# Patient Record
Sex: FEMALE | Race: WHITE | Employment: FULL TIME | URBAN - METROPOLITAN AREA
[De-identification: names, ages, dates, MRNs, and addresses within clinical notes are randomized per-mention and may not be internally consistent; named-entity substitution may affect disease eponyms.]

---

## 2019-12-05 ENCOUNTER — TELEPHONE (OUTPATIENT)
Dept: INFECTIOUS DISEASES | Age: 44
End: 2019-12-05

## 2019-12-06 ENCOUNTER — OFFICE VISIT (OUTPATIENT)
Dept: INFECTIOUS DISEASES | Age: 44
End: 2019-12-06
Payer: COMMERCIAL

## 2019-12-06 VITALS
WEIGHT: 229 LBS | DIASTOLIC BLOOD PRESSURE: 97 MMHG | RESPIRATION RATE: 22 BRPM | BODY MASS INDEX: 38.15 KG/M2 | TEMPERATURE: 99 F | HEART RATE: 94 BPM | SYSTOLIC BLOOD PRESSURE: 149 MMHG | HEIGHT: 65 IN

## 2019-12-06 DIAGNOSIS — N61.1 BREAST ABSCESS: ICD-10-CM

## 2019-12-06 DIAGNOSIS — N60.12 CHRONIC MASTITIS OF LEFT BREAST: Primary | ICD-10-CM

## 2019-12-06 DIAGNOSIS — A49.8 ANAEROBIC BACTERIAL INFECTION: ICD-10-CM

## 2019-12-06 PROCEDURE — 99203 OFFICE O/P NEW LOW 30 MIN: CPT | Performed by: INTERNAL MEDICINE

## 2019-12-06 RX ORDER — METHYLPREDNISOLONE 4 MG/1
4 TABLET ORAL DAILY
COMMUNITY
End: 2019-12-20 | Stop reason: ALTCHOICE

## 2019-12-06 RX ORDER — METRONIDAZOLE 500 MG/1
500 TABLET ORAL 3 TIMES DAILY
Qty: 42 TABLET | Refills: 0 | Status: SHIPPED | OUTPATIENT
Start: 2019-12-06 | End: 2019-12-20 | Stop reason: ALTCHOICE

## 2019-12-06 RX ORDER — LINEZOLID 600 MG/1
600 TABLET, FILM COATED ORAL 2 TIMES DAILY
COMMUNITY
End: 2019-12-20 | Stop reason: ALTCHOICE

## 2019-12-06 RX ORDER — SUCRALFATE 1 G/1
1 TABLET ORAL 4 TIMES DAILY
COMMUNITY
End: 2019-12-20 | Stop reason: ALTCHOICE

## 2019-12-06 RX ORDER — LACTOBACILLUS ACIDOPH-L.BULGARICUS 1 MILLION CELL CHEWABLE TABLET 1MM CELL
1 TABLET,CHEWABLE ORAL 2 TIMES DAILY
Qty: 14 TABLET | Refills: 2 | Status: SHIPPED | OUTPATIENT
Start: 2019-12-06 | End: 2019-12-20 | Stop reason: ALTCHOICE

## 2019-12-20 ENCOUNTER — OFFICE VISIT (OUTPATIENT)
Dept: INFECTIOUS DISEASES | Age: 44
End: 2019-12-20
Payer: COMMERCIAL

## 2019-12-20 VITALS
RESPIRATION RATE: 14 BRPM | BODY MASS INDEX: 37.82 KG/M2 | HEIGHT: 65 IN | TEMPERATURE: 98.9 F | WEIGHT: 227 LBS | SYSTOLIC BLOOD PRESSURE: 112 MMHG | DIASTOLIC BLOOD PRESSURE: 86 MMHG | HEART RATE: 82 BPM

## 2019-12-20 DIAGNOSIS — N61.1 BREAST ABSCESS: ICD-10-CM

## 2019-12-20 DIAGNOSIS — A49.8 ANAEROBIC BACTERIAL INFECTION: ICD-10-CM

## 2019-12-20 DIAGNOSIS — N60.12 CHRONIC MASTITIS OF LEFT BREAST: Primary | ICD-10-CM

## 2019-12-20 PROCEDURE — G8484 FLU IMMUNIZE NO ADMIN: HCPCS | Performed by: INTERNAL MEDICINE

## 2019-12-20 PROCEDURE — 4004F PT TOBACCO SCREEN RCVD TLK: CPT | Performed by: INTERNAL MEDICINE

## 2019-12-20 PROCEDURE — G8417 CALC BMI ABV UP PARAM F/U: HCPCS | Performed by: INTERNAL MEDICINE

## 2019-12-20 PROCEDURE — 99213 OFFICE O/P EST LOW 20 MIN: CPT | Performed by: INTERNAL MEDICINE

## 2019-12-20 PROCEDURE — G8427 DOCREV CUR MEDS BY ELIG CLIN: HCPCS | Performed by: INTERNAL MEDICINE

## 2019-12-20 RX ORDER — METRONIDAZOLE 500 MG/1
500 TABLET ORAL 3 TIMES DAILY
Qty: 42 TABLET | Refills: 0 | Status: SHIPPED | OUTPATIENT
Start: 2019-12-20 | End: 2020-01-03

## 2021-06-07 ENCOUNTER — OFFICE VISIT (OUTPATIENT)
Dept: PAIN MANAGEMENT | Age: 46
End: 2021-06-07
Payer: COMMERCIAL

## 2021-06-07 VITALS
WEIGHT: 220 LBS | BODY MASS INDEX: 38.98 KG/M2 | HEIGHT: 63 IN | DIASTOLIC BLOOD PRESSURE: 88 MMHG | TEMPERATURE: 97.6 F | SYSTOLIC BLOOD PRESSURE: 128 MMHG

## 2021-06-07 DIAGNOSIS — M47.812 CERVICAL SPONDYLOSIS WITHOUT MYELOPATHY: Primary | ICD-10-CM

## 2021-06-07 DIAGNOSIS — Z95.828 S/P PICC CENTRAL LINE PLACEMENT: ICD-10-CM

## 2021-06-07 DIAGNOSIS — M79.602 BILATERAL ARM PAIN: ICD-10-CM

## 2021-06-07 DIAGNOSIS — M77.12 LATERAL EPICONDYLITIS OF BOTH ELBOWS: ICD-10-CM

## 2021-06-07 DIAGNOSIS — M79.601 BILATERAL ARM PAIN: ICD-10-CM

## 2021-06-07 DIAGNOSIS — M77.11 LATERAL EPICONDYLITIS OF BOTH ELBOWS: ICD-10-CM

## 2021-06-07 DIAGNOSIS — N61.0 BREAST INFECTION IN FEMALE: ICD-10-CM

## 2021-06-07 PROCEDURE — G8427 DOCREV CUR MEDS BY ELIG CLIN: HCPCS | Performed by: PHYSICAL MEDICINE & REHABILITATION

## 2021-06-07 PROCEDURE — G8417 CALC BMI ABV UP PARAM F/U: HCPCS | Performed by: PHYSICAL MEDICINE & REHABILITATION

## 2021-06-07 PROCEDURE — 4004F PT TOBACCO SCREEN RCVD TLK: CPT | Performed by: PHYSICAL MEDICINE & REHABILITATION

## 2021-06-07 PROCEDURE — 99213 OFFICE O/P EST LOW 20 MIN: CPT | Performed by: PHYSICAL MEDICINE & REHABILITATION

## 2021-06-07 RX ORDER — TIZANIDINE 4 MG/1
4 TABLET ORAL 4 TIMES DAILY PRN
Qty: 120 TABLET | Refills: 0 | Status: SHIPPED | OUTPATIENT
Start: 2021-06-07 | End: 2021-07-07 | Stop reason: SDUPTHER

## 2021-06-07 RX ORDER — GABAPENTIN 800 MG/1
800 TABLET ORAL 4 TIMES DAILY
Qty: 360 TABLET | Refills: 0 | Status: SHIPPED | OUTPATIENT
Start: 2021-06-07 | End: 2021-09-01 | Stop reason: SDUPTHER

## 2021-06-07 NOTE — PROGRESS NOTES
Ernesto Simpson  (1975)    2021    Subjective:     Ernesto Simpson is 50 y.o. female who complains today of:    Chief Complaint   Patient presents with    Other     anastacio arm pain      Last called 21, last called by me 20: MRI cervical spine denied. EMG done, reviewed below. Her primary care physician is following her elevated LFTs. PICC placed for infection of her right breast.  To getting daily antibiotic infusions. Chest wall pain is resolved. No other test therapy updates modifications, no emergency room visits. Gabapentin 800 mg 4 times daily and tizanidine 4 mg 4 times daily help with remaining functional with chores, personal hygiene, remaining compliant with home exercise program, maintaining her quality of life, and performing activities of daily living. She obtains greater than 50% pain relief without any significant side effects. She is clear to avoid driving or operating heavy machinery or to perform any activities where she may harm herself or others while on pain medications. Low back pain not currently bothering her. Right chest wall pain currently resolved    Neck pain and bilateral arm pain is a 6 out of 10. Gets up to an 8 out of 10. Her pain is worse with activity moving her neck. Her pain is better with rest and pain medicine. It has been a constant ache for over 6 months. Right arm hurts more than left arm due to access venipuncture puncture site. There are no other associated symptoms or contextual factors. She denies any new weakness, saddle anesthesia, bowel or bladder dysfunction, or falls. Elbow pain bilaterally is a 3/10. Pain is worse with work. Allergies:  Bactrim [sulfamethoxazole-trimethoprim], Naproxen, Bupivacaine hcl, Lidocaine, and Penicillins    History reviewed. No pertinent past medical history. Past Surgical History:   Procedure Laterality Date     SECTION          HAND SURGERY      right hand     History reviewed. No pertinent family history. Social History     Socioeconomic History    Marital status: Single     Spouse name: Not on file    Number of children: Not on file    Years of education: Not on file    Highest education level: Not on file   Occupational History    Not on file   Tobacco Use    Smoking status: Current Every Day Smoker     Packs/day: 1.00     Types: Cigarettes    Smokeless tobacco: Former User    Tobacco comment: 1 pack a day   Substance and Sexual Activity    Alcohol use: No    Drug use: Not on file    Sexual activity: Not on file   Other Topics Concern    Not on file   Social History Narrative    Not on file     Social Determinants of Health     Financial Resource Strain:     Difficulty of Paying Living Expenses:    Food Insecurity:     Worried About [address] [employer] in the Last Year:     [address] [employer] in the Last Year:    Transportation Needs:     Lack of Transportation (Medical):  Lack of Transportation (Non-Medical):    Physical Activity:     Days of Exercise per Week:     Minutes of Exercise per Session:    Stress:     Feeling of Stress :    Social Connections:     Frequency of Communication with Friends and Family:     Frequency of Social Gatherings with Friends and Family:     Attends [employer] Services:     Active Member of Clubs or Organizations:     Attends Club or Organization Meetings:     Marital Status:    Intimate Partner Violence:     Fear of Current or Ex-Partner:     Emotionally Abused:     Physically Abused:     Sexually Abused:        Current Outpatient Medications on File Prior to Visit   Medication Sig Dispense Refill    CHANTIX STARTING MONTH MARIAA 0.5 MG X 11 & 1 MG X 42 tablet       lidocaine (LMX) 4 % cream Apply a half dollar sized amount to intact skin topically up to twice daily as needed for pain 1 Tube 1     No current facility-administered medications on file prior to visit. She has tried physical therapy.    Date of lastof last PT treatment: February 2020 @ [facility] for her arms. HPI    Review of Systems   Constitutional: Negative for fever. HENT: Negative for hearing loss. Respiratory: Negative for shortness of breath. Gastrointestinal: Negative for constipation, diarrhea and nausea. Genitourinary: Negative for difficulty urinating. Musculoskeletal: Negative for back pain and neck pain. Skin: Negative for rash. Neurological: Negative for headaches. Hematological: Does not bruise/bleed easily. Psychiatric/Behavioral: Positive for sleep disturbance. Objective:     Vitals:  /88   Temp 97.6 °F (36.4 °C)   Ht 5' 3\" (1.6 m)   Wt 220 lb (99.8 kg)   BMI 38.97 kg/m² Pain Score:   6      Physical Exam  General: No acute distress  Eyes: No scleral icterus appreciated bilaterally  ENMT: Moist mucous membranes  Neck: Symmetric without any overt masses or lesions  Respiratory: Respirations are non-labored  Skin: Visualized skin is intact  Psych: Pleasant and cooperative with history and exam.  Neurologic: Gait non antalgic, no assistive devices for ambulation. Pt is alert and appropriately interactive. No signs of excessive sedation. Responds promptly and appropriately to questions asked. No aberrant behaviors appreciated. Mild tenderness lat epicondyle bilaterally   Tender more in distal triceps bilaterally  PICC left arm    Sensation intact in both arms except for ? C6 paresthesias bilaterally  Reflexes and strength functional for arm use, no abnormal reflexes appreciated on exam today  Strength greater than 3/5 in both arms  Spurling's positive on exam today      EMG B UE 2/22/2021: This study is normal. There is no current electrodiagnostic evidence for an active bilateral cervical motor radiculopathy, bilateral median mononeuropathy, or generalized large fiber sensorimotor peripheral polyneuropathy. US B Elbows 3/11/20: bilateral common extensor tendon tendinopathy. Normal ulnar nerve measurements.   EMG B UE 1/17/20: This study is limited, but normal. There is no current electrodiagnostic evidence for a generalized large fiber sensorimotor peripheral polyneuropathy. Although limited, there is no clear evidence for an active cervical motor radiculopathy. X-rays cervical spine 10/7/19: Degenerative disc disease C5-6. No fracture. Assessment:      Diagnosis Orders   1. Cervical spondylosis without myelopathy  MRI CERVICAL SPINE W WO CONTRAST    gabapentin (NEURONTIN) 800 MG tablet    tiZANidine (ZANAFLEX) 4 MG tablet    XR CERVICAL SPINE (2-3 VIEWS)   2. Bilateral arm pain  MRI CERVICAL SPINE W WO CONTRAST    XR CERVICAL SPINE (2-3 VIEWS)   3. S/P PICC central line placement  MRI CERVICAL SPINE W WO CONTRAST   4. Breast infection in female  MRI CERVICAL SPINE W WO CONTRAST   5. Lateral epicondylitis of both elbows  tiZANidine (ZANAFLEX) 4 MG tablet       -Min relief with Meloxicam 7.5 mg BID, allergy with Naproxen. Plan:     Periodic Controlled Substance Monitoring: Obtaining appropriate analgesic effect of treatment. Farhad Ye MD)    Orders Placed This Encounter   Medications    gabapentin (NEURONTIN) 800 MG tablet     Sig: Take 1 tablet by mouth 4 times daily for 90 days.      Dispense:  360 tablet     Refill:  0    tiZANidine (ZANAFLEX) 4 MG tablet     Sig: Take 1 tablet by mouth 4 times daily as needed (pain spasms) As needed for pain and spasms     Dispense:  120 tablet     Refill:  0       Orders Placed This Encounter   Procedures    MRI CERVICAL SPINE W WO CONTRAST     Standing Status:   Future     Standing Expiration Date:   9/5/2021     Order Specific Question:   Reason for exam:     Answer:   eval canal stenosis, h/o breast infection on PICC    XR CERVICAL SPINE (2-3 VIEWS)     Standing Status:   Future     Standing Expiration Date:   6/7/2022     Order Specific Question:   Reason for exam:     Answer:   Please evaluate for the presence of cervical facet arthropathy     -Follow with primary care regarding elevated ALT liver enzyme test.  Follow-up with infectious disease as recommended. XR C-spine AP Lat eval facet arthropathy  Due to persistent pain despite conservative treatment, inability to tolerate NSAIDs due to allergy with anaphylaxis, recommend:  -MRI C Spine with and without contrast eval canal stenosis, ongoing infection requiring IV antibiotics  -Continue with PT and home exercise program   -Given Naproxen allergy with anaphylaxis, will hold on other NSAIDs trials. Min relife with Meloxicam 7.5 mg BID due to min relief  -Continue gabapentin 800 mg QID #360 no refills valid June to September 2021. OAR[place] reviewed on 6/7/2021   -Continue Tizanidine 4 mg 4 times daily as needed #120 no refills start 6/7/2021 Avoid all other muscle relaxers and/or sedating medicines. -Will hold on interventions given lack of clear pain source. Encourage Neurology evaluation      Controlled Substance Monitoring:    Acute and Chronic Pain Monitoring:   RX Monitoring 6/7/2021   Periodic Controlled Substance Monitoring Obtaining appropriate analgesic effect of treatment. Discussed the risks, side effects, and symptoms that would warrant urgent or emergent physician evaluation of all medications prescribed today. The patient was advised that NSAID-type medications have three important potential side effects: gastrointestinal irritation including hemorrhage, myocardial injury including possible infarction, and kidney injury. She was asked to take the medication with food, avoid any other NSAIDs or steroids, and discontinue if she develops any concerning symptoms. She should immediately stop the medication and proceed to the emergency department for chest pain, dark urine or difficulty with producing urine, vomiting, abdominal pain or black/tarry stools. The patient expresses understanding of these issues and all questions were answered.      Provided education and counseling regarding the diagnosis, prognosis, and treatment options. All questions were answered. Encouraged her to follow-up with her primary care physician and/or specialists as required for her overall health and management of her comorbidities as well as any new positive symptoms mentioned in review of systems above. Care was provided within the definitions and limitations of our specialty practice. Encouraged lifestyle interventions including healthy habits, lifestyle changes, regular aerobic exercise and appropriate weight maintenance as advised by their primary care physician or cardiovascular health provider. Discussed well care and disease prevention/maintenance. All recommendations for medications are meant to help decrease pain, improve function with activities of daily living, maintain compliance with home exercise program, and improve quality of life. Encouraged compliance with her home exercise program. Discussed the elevated risks of excessive sedation while on pain medications. Advised her against driving or operating heavy machinery or performing any activities where she may harm herself or others while on pain medications. Particular caution was emphasized especially during dose adjustments and medication changes. Discussed the risks of temporary disability, permanent disability, morbidity, and mortality with poorly-managed or undiagnosed medical conditions and comorbidities. Emphasized the importance of timely medical evaluation and treatment as previously recommended by us or other medical professionals. Risks of not pursing these recommendations were emphasized. Advised her that any lab testing, imaging, or other diagnostic test results are best discussed in person in the office so that we can provide a clear explanation of their significance and best treatment based upon these results. It is her responsibility to make and keep a follow up appointment to discuss these test results in person.  She expressed complete understanding and agreement with the entire plan as outlined above. Portions of this note may have been typed, auto-populated, dictated or transcribed by voice recognition resulting in errors, omissions, or close substitutions which may be missed despite careful proofreading. Please contact the author for any questions or concerns. This note was not reviewed to expedite clinical care. Follow up:  Return in about 1 month (around 7/7/2021) for reassessment of pain and symptoms.     Gary Thornton MD

## 2021-06-17 DIAGNOSIS — M79.602 BILATERAL ARM PAIN: ICD-10-CM

## 2021-06-17 DIAGNOSIS — M79.601 BILATERAL ARM PAIN: ICD-10-CM

## 2021-06-17 DIAGNOSIS — M47.812 CERVICAL SPONDYLOSIS WITHOUT MYELOPATHY: ICD-10-CM

## 2021-06-23 ENCOUNTER — TELEPHONE (OUTPATIENT)
Dept: PAIN MANAGEMENT | Age: 46
End: 2021-06-23

## 2021-06-23 NOTE — TELEPHONE ENCOUNTER
Received call from [facility] [place] [employer]. They state that patient's insurance is requiring a peer to peer, number to call is [phone] ref #[id number].

## 2021-06-25 NOTE — TELEPHONE ENCOUNTER
Okay to call for peer to peer, please interrupt me when they are on the phone so that this may be completed.  Thank you, SM

## 2021-06-30 NOTE — TELEPHONE ENCOUNTER
Stefan from [address] [place] [address] called to see if peer to peer had been completed. She said right now the MRI is scheduled for 7/7 but they will need to cancel it if the auth is not completed.

## 2021-07-01 NOTE — TELEPHONE ENCOUNTER
Peer to peer has not yet been completed. Okay to proceed with peer to peer as soon as representative is available.

## 2021-07-07 ENCOUNTER — OFFICE VISIT (OUTPATIENT)
Dept: PAIN MANAGEMENT | Age: 46
End: 2021-07-07
Payer: COMMERCIAL

## 2021-07-07 VITALS
WEIGHT: 200 LBS | DIASTOLIC BLOOD PRESSURE: 88 MMHG | BODY MASS INDEX: 33.32 KG/M2 | TEMPERATURE: 97.1 F | HEIGHT: 65 IN | SYSTOLIC BLOOD PRESSURE: 128 MMHG

## 2021-07-07 DIAGNOSIS — M77.11 LATERAL EPICONDYLITIS OF BOTH ELBOWS: ICD-10-CM

## 2021-07-07 DIAGNOSIS — M47.812 CERVICAL SPONDYLOSIS WITHOUT MYELOPATHY: ICD-10-CM

## 2021-07-07 DIAGNOSIS — M77.12 LATERAL EPICONDYLITIS OF BOTH ELBOWS: ICD-10-CM

## 2021-07-07 PROCEDURE — G8427 DOCREV CUR MEDS BY ELIG CLIN: HCPCS | Performed by: NURSE PRACTITIONER

## 2021-07-07 PROCEDURE — G8417 CALC BMI ABV UP PARAM F/U: HCPCS | Performed by: NURSE PRACTITIONER

## 2021-07-07 PROCEDURE — 4004F PT TOBACCO SCREEN RCVD TLK: CPT | Performed by: NURSE PRACTITIONER

## 2021-07-07 PROCEDURE — 99213 OFFICE O/P EST LOW 20 MIN: CPT | Performed by: NURSE PRACTITIONER

## 2021-07-07 RX ORDER — TIZANIDINE 4 MG/1
4 TABLET ORAL 4 TIMES DAILY PRN
Qty: 120 TABLET | Refills: 0 | Status: SHIPPED | OUTPATIENT
Start: 2021-07-07 | End: 2021-08-02 | Stop reason: SDUPTHER

## 2021-07-07 ASSESSMENT — ENCOUNTER SYMPTOMS
CONSTIPATION: 0
BACK PAIN: 0
TROUBLE SWALLOWING: 0
COUGH: 0
SHORTNESS OF BREATH: 0
GASTROINTESTINAL NEGATIVE: 1
EYES NEGATIVE: 1
DIARRHEA: 0
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
## 2021-07-12 NOTE — TELEPHONE ENCOUNTER
SPOKE WITH THE PATIENT AND SHE STATED SHE RECEIVED A LETTER STATING IT WAS DENIED AND THEN SHE GOT A CALL LATER STATING IT WAS APPROVED, THEY WANTED HER TO COME TO [facility] IN [place], PATIENT STATED SHE WILL CALL THEM TO SEE WHAT IS GOING, AND WANTED TO KNOW IF ITS NOT APPROVED THEN WHAT DOES SHE NEED TO DO, I TOLD HER SHE WOULD HAVE TO SPEAK TO THEM REGARDING ANY PAYMENTS.

## 2021-07-28 ENCOUNTER — HOSPITAL ENCOUNTER (OUTPATIENT)
Dept: MRI IMAGING | Age: 46
Discharge: HOME | End: 2021-07-30
Payer: COMMERCIAL

## 2021-07-28 DIAGNOSIS — M47.812 CERVICAL SPONDYLOSIS WITHOUT MYELOPATHY: ICD-10-CM

## 2021-07-28 DIAGNOSIS — M79.602 BILATERAL ARM PAIN: ICD-10-CM

## 2021-07-28 DIAGNOSIS — Z95.828 S/P PICC CENTRAL LINE PLACEMENT: ICD-10-CM

## 2021-07-28 DIAGNOSIS — N61.0 BREAST INFECTION IN FEMALE: ICD-10-CM

## 2021-07-28 DIAGNOSIS — M79.601 BILATERAL ARM PAIN: ICD-10-CM

## 2021-07-28 PROCEDURE — 72141 MRI NECK SPINE W/O DYE: CPT

## 2021-08-02 ENCOUNTER — VIRTUAL VISIT (OUTPATIENT)
Dept: PAIN MANAGEMENT | Age: 46
End: 2021-08-02
Payer: COMMERCIAL

## 2021-08-02 DIAGNOSIS — M79.602 BILATERAL ARM PAIN: ICD-10-CM

## 2021-08-02 DIAGNOSIS — M43.10 RETROLISTHESIS OF VERTEBRAE: ICD-10-CM

## 2021-08-02 DIAGNOSIS — M47.812 CERVICAL SPONDYLOSIS WITHOUT MYELOPATHY: Primary | ICD-10-CM

## 2021-08-02 DIAGNOSIS — M77.11 LATERAL EPICONDYLITIS OF BOTH ELBOWS: ICD-10-CM

## 2021-08-02 DIAGNOSIS — M79.601 BILATERAL ARM PAIN: ICD-10-CM

## 2021-08-02 DIAGNOSIS — M77.12 LATERAL EPICONDYLITIS OF BOTH ELBOWS: ICD-10-CM

## 2021-08-02 PROCEDURE — G8427 DOCREV CUR MEDS BY ELIG CLIN: HCPCS | Performed by: PHYSICAL MEDICINE & REHABILITATION

## 2021-08-02 PROCEDURE — 4004F PT TOBACCO SCREEN RCVD TLK: CPT | Performed by: PHYSICAL MEDICINE & REHABILITATION

## 2021-08-02 PROCEDURE — 99213 OFFICE O/P EST LOW 20 MIN: CPT | Performed by: PHYSICAL MEDICINE & REHABILITATION

## 2021-08-02 PROCEDURE — G8417 CALC BMI ABV UP PARAM F/U: HCPCS | Performed by: PHYSICAL MEDICINE & REHABILITATION

## 2021-08-02 RX ORDER — TIZANIDINE 4 MG/1
4 TABLET ORAL 4 TIMES DAILY PRN
Qty: 120 TABLET | Refills: 0 | Status: SHIPPED | OUTPATIENT
Start: 2021-08-06 | End: 2021-09-01 | Stop reason: SDUPTHER

## 2021-08-02 ASSESSMENT — ENCOUNTER SYMPTOMS
DIARRHEA: 0
CONSTIPATION: 0
NAUSEA: 0
SHORTNESS OF BREATH: 0
BACK PAIN: 0

## 2021-08-02 NOTE — PROGRESS NOTES
Arlette Read  (1975)    8/2/2021    TELEHEALTH EVALUATION -- Audio/Visual (During [facility]OJJC-59 public health emergency)    Due to COVID 19 outbreak, patient's office visit was converted to a virtual visit. Patient was contacted and agreed to proceed with a virtual visit via audio-visual platform. Patient understands that this encounter is a billable visit and that insurance coverage and co-pays are up to their individual insurance plans. At the beginning of this telehealth encounter, I verified with the patient their name and date of birth. Verbal consent for telehealth encounter was obtained. Called on 8/2/2021 at 6:21 pm and ended at 6:37 pm    Subjective:       Chief Complaint   Patient presents with    Neck Pain       Arlette Read is 50 y.o. female who complains today of:     Last seen 6/7/21 by me, last seen 7/7/21: dog had a stroke, having a difficult time, worried about a brain tumor. MRI cervical spine x-ray cervical spine done, reviewed below. No other updates from other physicians, no emergency room visits. No other test therapy updates modifications, no emergency room visits. Gabapentin 800 mg 4 times daily and tizanidine 4 mg 4 times daily help with remaining functional with chores, personal hygiene, remaining compliant with home exercise program, maintaining her quality of life, and performing activities of daily living. She obtains greater than 50% pain relief without any significant side effects. She is clear to avoid driving or operating heavy machinery or to perform any activities where she may harm herself or others while on pain medications.      Neck pain and bilateral arm pain is a 4/10. Gets to a 7/10. Located in the back of both arms and goes into both hands. Pain is worse with activity. Better with rest and pain medicine. Constant ache for over 8 months. Right arm hurts more than left arm due to access venipuncture puncture site.  There are no other associated symptoms or contextual factors. She denies any new weakness, saddle anesthesia, bowel or bladder dysfunction, or falls.      Elbow pain bilaterally is a 2/10. Pain is worse with work. She denies any clear joint related pain in her shoulders elbow or hands bilaterally. Allergies:  Bactrim [sulfamethoxazole-trimethoprim], Naproxen, Bupivacaine hcl, Lidocaine, and Penicillins    History:  History reviewed. No pertinent past medical history. Past Surgical History:   Procedure Laterality Date     SECTION          HAND SURGERY      right hand     History reviewed. No pertinent family history. Social History     Socioeconomic History    Marital status: Single     Spouse name: Not on file    Number of children: Not on file    Years of education: Not on file    Highest education level: Not on file   Occupational History    Not on file   Tobacco Use    Smoking status: Current Every Day Smoker     Packs/day: 1.00     Types: Cigarettes    Smokeless tobacco: Former User    Tobacco comment: 1 pack a day   Substance and Sexual Activity    Alcohol use: No    Drug use: Not on file    Sexual activity: Not on file   Other Topics Concern    Not on file   Social History Narrative    Not on file     Social Determinants of Health     Financial Resource Strain:     Difficulty of Paying Living Expenses:    Food Insecurity:     Worried About [address] [facility] in the Last Year:     [address] in the Last Year:    Transportation Needs:     Lack of Transportation (Medical):      Lack of Transportation (Non-Medical):    Physical Activity:     Days of Exercise per Week:     Minutes of Exercise per Session:    Stress:     Feeling of Stress :    Social Connections:     Frequency of Communication with Friends and Family:     Frequency of Social Gatherings with Friends and Family:     Attends Yarsanism Services:     Active Member of Clubs or Organizations:     Attends Club or Organization Meetings:     Marital Status:    Intimate Partner Violence:     Fear of Current or Ex-Partner:     Emotionally Abused:     Physically Abused:     Sexually Abused:        Current Outpatient Medications on File Prior to Visit   Medication Sig Dispense Refill    gabapentin (NEURONTIN) 800 MG tablet Take 1 tablet by mouth 4 times daily for 90 days. 360 tablet 0    CHANTIX STARTING MONTH MARIAA 0.5 MG X 11 & 1 MG X 42 tablet       lidocaine (LMX) 4 % cream Apply a half dollar sized amount to intact skin topically up to twice daily as needed for pain 1 Tube 1     No current facility-administered medications on file prior to visit. Review of Systems   Constitutional: Negative for fever. HENT: Negative for hearing loss. Respiratory: Negative for shortness of breath. Gastrointestinal: Negative for constipation, diarrhea and nausea. Genitourinary: Negative for difficulty urinating. Musculoskeletal: Negative for back pain and neck pain. Skin: Negative for rash. Neurological: Negative for headaches. Hematological: Does not bruise/bleed easily. Psychiatric/Behavioral: Negative for sleep disturbance. Objective:     Vitals: There were no vitals taken for this visit. PHYSICAL EXAMINATION:    [x] Alert  [x] Oriented to person/place/time  [x] No apparent distress      [x] Mood and affect normal  [x] Recent and remote memory intact  [x] Judgement and insight normal     [x] Breathing appears normal  [x] No rash, lesions or ulcers on visible skin    [] Cranial Nerves II-XII grossly intact    [] Motor grossly intact in visible upper extremities    [] Motor grossly intact in visible lower extremities    [] Normal gait and station   [] No digital cyanosis    [] OTHER:      Due to this being a TeleHealth encounter, evaluation of the following organ systems is limited: Vitals/Constitutional/EENT/Resp/CV/GI//MS/Neuro/Skin/Heme-Lymph-Imm.       MRI cervical spine 7/28/2021: Degenerative disease and facet arthropathy. No fracture. C1-2 normal.  C2-3 normal canal foramen. C3-4 normal canal foramen. C4-5 normal canal foramen. C5-6 mild canal stenosis, mild bilateral foraminal narrowing. C6-7 borderline canal stenosis, normal foramen. C7-T1 normal canal foramen. XR C-spine 6/17/2021: Degenerative disease and facet arthropathy,, no no fracture, C5-6 retrolisthesis. EMG B UE 2/22/2021: This study is normal. There is no current electrodiagnostic evidence for an active bilateral cervical motor radiculopathy, bilateral median mononeuropathy, or generalized large fiber sensorimotor peripheral polyneuropathy. US B Elbows 3/11/20: bilateral common extensor tendon tendinopathy. Normal ulnar nerve measurements. EMG B UE 1/17/20: This study is limited, but normal. There is no current electrodiagnostic evidence for a generalized large fiber sensorimotor peripheral polyneuropathy.  Although limited, there is no clear evidence for an active cervical motor radiculopathy. X-rays cervical spine 10/7/19: Degenerative disc disease C5-6.  No fracture. Assessment:      Diagnosis Orders   1. Cervical spondylosis without myelopathy  tiZANidine (ZANAFLEX) 4 MG tablet    XR CERVICAL SPINE W OBLIQUES FLEXION AND EXTENSION   2. Lateral epicondylitis of both elbows  tiZANidine (ZANAFLEX) 4 MG tablet   3. Retrolisthesis of vertebrae  XR CERVICAL SPINE W OBLIQUES FLEXION AND EXTENSION   4. Bilateral arm pain  External Referral to Pain Management     -Min relief with Meloxicam 7.5 mg BID, allergy with Naproxen. Plan:     Periodic Controlled Substance Monitoring: Obtaining appropriate analgesic effect of treatment.  hCristiano Stoddard MD)    Orders Placed This Encounter   Medications    tiZANidine (ZANAFLEX) 4 MG tablet     Sig: Take 1 tablet by mouth 4 times daily as needed (pain spasms) As needed for pain and spasms     Dispense:  120 tablet     Refill:  0       Orders Placed This Encounter   Procedures    XR CERVICAL SPINE W OBLIQUES FLEXION AND EXTENSION     Standing Status:   Future     Standing Expiration Date:   8/2/2022     Order Specific Question:   Reason for exam:     Answer:   eval instability    External Referral to Pain Management     Referral Priority:   Routine     Referral Type:   Eval and Treat     Referral Reason:   Specialty Services Required     Requested Specialty:   Pain Management     Number of Visits Requested:   1     -Follow with primary care regarding elevated ALT liver enzyme test.  Follow-up with infectious disease as recommended. Reviewed XR C-spine and MRI C-spine above, all questions answered  XR C-spine flexion-extension eval instability, retrolisthesis C5-6  Unclear cause for patient's bilateral arm pain. Will refer her to sports medicine as well as second opinion pain management for further evaluation. Continue with PT and home exercise program   -Given Naproxen allergy with anaphylaxis, will hold on other NSAIDs trials. Min relife with Meloxicam 7.5 mg BID due to min relief  -Continue gabapentin 800 mg QID , no ref June to September 2021. [place] reviewed on 8/2/2021   -Continue Tizanidine 4 mg 4 times daily as needed #120 no refills start 8/6-9/5/21. Avoid all other muscle relaxers and/or sedating medicines. -Will hold on interventions given lack of clear pain source. Encourage Neurology, sports medicine, and pain management 2nd opinions. Controlled Substance Monitoring:    Acute and Chronic Pain Monitoring:   RX Monitoring 8/2/2021   Periodic Controlled Substance Monitoring Obtaining appropriate analgesic effect of treatment. Discussed the risks, side effects, and symptoms that would warrant urgent or emergent physician evaluation of all medications prescribed today. The patient was advised that NSAID-type medications have three important potential side effects: gastrointestinal irritation including hemorrhage, myocardial injury including possible infarction, and kidney injury.  She was asked to take the medication with food, avoid any other NSAIDs or steroids, and discontinue if she develops any concerning symptoms. She should immediately stop the medication and proceed to the emergency department for chest pain, dark urine or difficulty with producing urine, vomiting, abdominal pain or black/tarry stools. The patient expresses understanding of these issues and all questions were answered.      Provided education and counseling regarding the diagnosis, prognosis, and treatment options. All questions were answered. Encouraged her to follow-up with her primary care physician and/or specialists as required for her overall health and management of her comorbidities as well as any new positive symptoms mentioned in review of systems above. Care was provided within the definitions and limitations of our specialty practice. Encouraged lifestyle interventions including healthy habits, lifestyle changes, regular aerobic exercise and appropriate weight maintenance as advised by their primary care physician or cardiovascular health provider. Discussed well care and disease prevention/maintenance.      All recommendations for medications are meant to help decrease pain, improve function with activities of daily living, maintain compliance with home exercise program, and improve quality of life.      Encouraged compliance with her home exercise program. Discussed the elevated risks of excessive sedation while on pain medications. Advised her against driving or operating heavy machinery or performing any activities where she may harm herself or others while on pain medications. Particular caution was emphasized especially during dose adjustments and medication changes.      Discussed the risks of temporary disability, permanent disability, morbidity, and mortality with poorly-managed or undiagnosed medical conditions and comorbidities.  Emphasized the importance of timely medical evaluation and treatment as previously recommended by us or other medical professionals. Risks of not pursing these recommendations were emphasized.     Advised her that any lab testing, imaging, or other diagnostic test results are best discussed in person in the office so that we can provide a clear explanation of their significance and best treatment based upon these results. It is her responsibility to make and keep a follow up appointment to discuss these test results in person. She expressed complete understanding and agreement with the entire plan as outlined above. Portions of this note may have been typed, auto-populated, dictated or transcribed by voice recognition resulting in errors, omissions, or close substitutions which may be missed despite careful proofreading. Please contact the author for any questions or concerns. This note was not reviewed to expedite clinical care. Follow up:  Return in about 1 month (around 9/2/2021) for reassessment of pain and symptoms, External Referral.    Ansel Lanes, MD      >50% of 16 minute appointment was spent with discussion, addressing questions and concerns, including prognosis, treatment options, patient education, and recommendations. [id number]    Pursuant to the emergency declaration under the [facility], 1135 waiver authority and the [employer] Act, this Virtual  Visit was conducted, with patient's consent, to reduce the patient's risk of exposure to COVID-19 and provide continuity of care for an established patient. Services were provided through a video synchronous discussion virtually to substitute for in-person clinic visit.

## 2021-09-01 ENCOUNTER — VIRTUAL VISIT (OUTPATIENT)
Dept: PAIN MANAGEMENT | Age: 46
End: 2021-09-01
Payer: COMMERCIAL

## 2021-09-01 DIAGNOSIS — M77.12 LATERAL EPICONDYLITIS OF BOTH ELBOWS: ICD-10-CM

## 2021-09-01 DIAGNOSIS — M25.511 CHRONIC PAIN OF BOTH SHOULDERS: Primary | ICD-10-CM

## 2021-09-01 DIAGNOSIS — M79.601 BILATERAL ARM PAIN: ICD-10-CM

## 2021-09-01 DIAGNOSIS — G89.29 CHRONIC PAIN OF BOTH SHOULDERS: Primary | ICD-10-CM

## 2021-09-01 DIAGNOSIS — M25.512 CHRONIC PAIN OF BOTH SHOULDERS: Primary | ICD-10-CM

## 2021-09-01 DIAGNOSIS — M47.812 CERVICAL SPONDYLOSIS WITHOUT MYELOPATHY: ICD-10-CM

## 2021-09-01 DIAGNOSIS — M77.11 LATERAL EPICONDYLITIS OF BOTH ELBOWS: ICD-10-CM

## 2021-09-01 DIAGNOSIS — M79.602 BILATERAL ARM PAIN: ICD-10-CM

## 2021-09-01 PROCEDURE — 99214 OFFICE O/P EST MOD 30 MIN: CPT | Performed by: PHYSICAL MEDICINE & REHABILITATION

## 2021-09-01 PROCEDURE — 4004F PT TOBACCO SCREEN RCVD TLK: CPT | Performed by: PHYSICAL MEDICINE & REHABILITATION

## 2021-09-01 PROCEDURE — G8427 DOCREV CUR MEDS BY ELIG CLIN: HCPCS | Performed by: PHYSICAL MEDICINE & REHABILITATION

## 2021-09-01 PROCEDURE — G8417 CALC BMI ABV UP PARAM F/U: HCPCS | Performed by: PHYSICAL MEDICINE & REHABILITATION

## 2021-09-01 RX ORDER — GABAPENTIN 800 MG/1
800 TABLET ORAL 4 TIMES DAILY
Qty: 360 TABLET | Refills: 0 | Status: SHIPPED | OUTPATIENT
Start: 2021-09-01 | End: 2021-11-17 | Stop reason: SDUPTHER

## 2021-09-01 RX ORDER — TIZANIDINE 4 MG/1
4 TABLET ORAL 4 TIMES DAILY PRN
Qty: 120 TABLET | Refills: 0 | Status: SHIPPED | OUTPATIENT
Start: 2021-09-01 | End: 2021-10-04 | Stop reason: SDUPTHER

## 2021-09-01 ASSESSMENT — ENCOUNTER SYMPTOMS
DIARRHEA: 0
CONSTIPATION: 0
NAUSEA: 0
SHORTNESS OF BREATH: 0
BACK PAIN: 0

## 2021-09-01 NOTE — PROGRESS NOTES
Rajni Mcleod  (1975)    9/1/2021    TELEHEALTH EVALUATION -- Audio/Visual (During XFDOJ-03 public health emergency)    Due to COVID 19 outbreak, patient's office visit was converted to a virtual visit. Patient was contacted and agreed to proceed with a virtual visit via audio-visual platform. Patient understands that this encounter is a billable visit and that insurance coverage and co-pays are up to their individual insurance plans. At the beginning of this telehealth encounter, I verified with the patient their name and date of birth. Verbal consent for telehealth encounter was obtained. Called on 9/1/2021 at 12:28 pm and ended at 12:40 pm    Subjective:       Chief Complaint   Patient presents with    Arm Pain       Rajni Mcleod is 50 y.o. female who complains today of:     Last called by me on 8/2/2021: Her dog had aspiration pneumonia. Was not expected to live. Was given lots of antibiotics and her dog miraculously improved. She was given a $3000 vet bill. The patient is doing better as her dog is doing better. She did not get the x-ray done, confused that she had gotten an x-ray of her cervical spine done before. We discussed the difference that the flexion-extension films can make. No other updates from other physicians, no emergency room visits. No other test therapy updates modifications, no emergency room visits. Gabapentin 800 mg 4 times daily and tizanidine 4 mg QID help with remaining functional with chores, personal hygiene, remaining compliant with home exercise program, maintaining her quality of life, and performing activities of daily living. She obtains greater than 50% pain relief without any significant side effects. She is clear to avoid driving or operating heavy machinery or to perform any activities where she may harm herself or others while on pain medications. Worst pain today is located both shoulders. Bilateral shoulder pain is a 5/10.   Gets up to 7/10.  Is been a constant ache for over 3 months. The pain is worse with lifting things and using her arms. The pain is better with rest and pain medicine. There are no other associated symptoms or contextual factors. She denies any classic radicular symptoms, new weakness, saddle anesthesia, bowel or bladder dysfunction, or falls. Bilateral arm pain is a 5/10. Gets to a 7/10. Located in the back of both arms and goes into both hands. Pain is worse with activity. Better with rest and pain medicine. It has been a constant ache for over 9 months. Right arm hurts more than left arm. There are no other associated symptoms or contextual factors. She denies any new weakness, saddle anesthesia, bowel or bladder dysfunction, or falls.      Elbow pain bilaterally is a 1/10. Pain is worse with work. She denies any clear joint related pain in her shoulders elbow or hands bilaterally. Allergies:  Bactrim [sulfamethoxazole-trimethoprim], Naproxen, Bupivacaine hcl, Lidocaine, and Penicillins    History:  History reviewed. No pertinent past medical history. Past Surgical History:   Procedure Laterality Date     SECTION      1993    HAND SURGERY      right hand     History reviewed. No pertinent family history.   Social History     Socioeconomic History    Marital status: Single     Spouse name: Not on file    Number of children: Not on file    Years of education: Not on file    Highest education level: Not on file   Occupational History    Not on file   Tobacco Use    Smoking status: Current Every Day Smoker     Packs/day: 1.00     Types: Cigarettes    Smokeless tobacco: Former User    Tobacco comment: 1 pack a day   Substance and Sexual Activity    Alcohol use: No    Drug use: Not on file    Sexual activity: Not on file   Other Topics Concern    Not on file   Social History Narrative    Not on file     Social Determinants of Health     Financial Resource Strain:     Difficulty of Paying Living Expenses:    Food Insecurity:     Worried About Running Out of Food in the Last Year:     [address] in the Last Year:    Transportation Needs:     Lack of Transportation (Medical):  Lack of Transportation (Non-Medical):    Physical Activity:     Days of Exercise per Week:     Minutes of Exercise per Session:    Stress:     Feeling of Stress :    Social Connections:     Frequency of Communication with Friends and Family:     Frequency of Social Gatherings with Friends and Family:     Attends Islam Services:     Active Member of Clubs or Organizations:     Attends Club or Organization Meetings:     Marital Status:    Intimate Partner Violence:     Fear of Current or Ex-Partner:     Emotionally Abused:     Physically Abused:     Sexually Abused:        Current Outpatient Medications on File Prior to Visit   Medication Sig Dispense Refill    CHANTIX STARTING MONTH MARIAA 0.5 MG X 11 & 1 MG X 42 tablet       lidocaine (LMX) 4 % cream Apply a half dollar sized amount to intact skin topically up to twice daily as needed for pain 1 Tube 1     No current facility-administered medications on file prior to visit. Review of Systems   Constitutional: Negative for fever. HENT: Negative for hearing loss. Respiratory: Negative for shortness of breath. Gastrointestinal: Negative for constipation, diarrhea and nausea. Genitourinary: Negative for difficulty urinating. Musculoskeletal: Negative for back pain and neck pain. Skin: Negative for rash. Neurological: Negative for headaches. Hematological: Does not bruise/bleed easily. Psychiatric/Behavioral: Negative for sleep disturbance. Objective:     Vitals: There were no vitals taken for this visit.      PHYSICAL EXAMINATION:    [x] Alert  [x] Oriented to person/place/time  [x] No apparent distress      [x] Mood and affect normal  [x] Recent and remote memory intact  [x] Judgement and insight normal     [x] Breathing appears normal  [x] No rash, lesions or ulcers on visible skin    [] Cranial Nerves II-XII grossly intact    [] Motor grossly intact in visible upper extremities    [] Motor grossly intact in visible lower extremities    [] Normal gait and station   [] No digital cyanosis    [] OTHER:      Due to this being a TeleHealth encounter, evaluation of the following organ systems is limited: Vitals/Constitutional/EENT/Resp/CV/GI//MS/Neuro/Skin/Heme-Lymph-Imm. MRI cervical spine 7/28/2021: Degenerative disease and facet arthropathy. No fracture. C1-2 normal.  C2-3 normal canal foramen. C3-4 normal canal foramen. C4-5 normal canal foramen. C5-6 mild canal stenosis, mild bilateral foraminal narrowing. C6-7 borderline canal stenosis, normal foramen. C7-T1 normal canal foramen. XR C-spine 6/17/2021: Degenerative disease and facet arthropathy,, no no fracture, C5-6 retrolisthesis. EMG B UE 2/22/2021: This study is normal. There is no current electrodiagnostic evidence for an active bilateral cervical motor radiculopathy, bilateral median mononeuropathy, or generalized large fiber sensorimotor peripheral polyneuropathy. US B Elbows 3/11/20: bilateral common extensor tendon tendinopathy. Normal ulnar nerve measurements. EMG B UE 1/17/20: This study is limited, but normal. There is no current electrodiagnostic evidence for a generalized large fiber sensorimotor peripheral polyneuropathy.  Although limited, there is no clear evidence for an active cervical motor radiculopathy. X-rays cervical spine 10/7/19: Degenerative disc disease C5-6.  No fracture. MRI cervical spine 7/28/2021 films personally reviewed on 9/1/2021. There appears to be cervical canal stenosis worse at the C5-6 C6-7 levels. The axial T2 study appears to show C5-6 more than simply mild canal stenosis. Assessment:      Diagnosis Orders   1.  Chronic pain of both shoulders  [facility]    XR SHOULDER RIGHT (MIN 2 VIEWS)    XR SHOULDER LEFT (MIN 2 VIEWS)    MA ARTHROCENTESIS ASPIR&/INJ MAJOR JT/BURSA W/O US    CHG FLUOROSCOPIC GUIDANCE NEEDLE PLACEMENT ADD ON   2. Cervical spondylosis without myelopathy  tiZANidine (ZANAFLEX) 4 MG tablet    gabapentin (NEURONTIN) 800 MG tablet    External Referral - Mynor Nguyen MD - Spine Surgery, Sports Med, Arthroscopic Surgery   3. Lateral epicondylitis of both elbows  tiZANidine (ZANAFLEX) 4 MG tablet   4. Bilateral arm pain  External Referral - Mynor Nguyen MD - Spine Surgery, Sports Med, Arthroscopic Surgery     -Min relief with Meloxicam 7.5 mg BID, allergy with Naproxen. Plan:     Periodic Controlled Substance Monitoring: Obtaining appropriate analgesic effect of treatment. Galilea Ch MD)    Orders Placed This Encounter   Medications    tiZANidine (ZANAFLEX) 4 MG tablet     Sig: Take 1 tablet by mouth 4 times daily as needed (pain spasms) As needed for pain and spasms     Dispense:  120 tablet     Refill:  0    gabapentin (NEURONTIN) 800 MG tablet     Sig: Take 1 tablet by mouth 4 times daily for 90 days.      Dispense:  360 tablet     Refill:  0       Orders Placed This Encounter   Procedures    XR SHOULDER RIGHT (MIN 2 VIEWS)     Standing Status:   Future     Standing Expiration Date:   9/1/2022     Order Specific Question:   Reason for exam:     Answer:   Evaluate for fracture or osteoarthritis    XR SHOULDER LEFT (MIN 2 VIEWS)     Standing Status:   Future     Standing Expiration Date:   9/1/2022     Order Specific Question:   Reason for exam:     Answer:   Evaluate for fracture or osteoarthritis    [facility]     Referral Priority:   Routine     Referral Type:   Eval and Treat     Referral Reason:   Specialty Services Required     Requested Specialty:   Physical Therapy     Number of Visits Requested:   1    External Referral - Mynor Nguyen MD - Spine Surgery, Sports Med, Arthroscopic Surgery     Referral Priority:   Routine Substance Monitoring:    Acute and Chronic Pain Monitoring:   RX Monitoring 9/1/2021   Periodic Controlled Substance Monitoring Obtaining appropriate analgesic effect of treatment. Discussed the risks, side effects, and symptoms that would warrant urgent or emergent physician evaluation of all medications prescribed today. Discussed the risks of the above recommended procedures including but not limited to bleeding, infection, worsened pain, damage to surrounding structures, side effects, toxicity, allergic reactions to medications used, immune and stress-response dysfunction, fat necrosis, decreased bone mineralization, cartilage loss, increased fracture risk, avascular necrosis, skin pigmentation changes, blood sugar elevation, need for surgery, premature damage or degeneration of the joint, as well as catastrophic injury such as vision loss, paralysis, stroke, bowel or bladder incontinence, ventilator dependence, loss of use of the joint and/or extremity, and death. Discussed the risks, benefits, alternative procedures, and alternatives to the procedure including no procedure at all. Discussed that we cannot undo any permanent neurologic or orthopaedic damage or change the course of any underlying disease. After thorough discussion, patient expressed complete understanding and willingness to proceed. Provided education and counseling regarding the diagnosis, prognosis, and treatment options. All questions were answered. Encouraged her to follow-up with her primary care physician and/or specialists as required for her overall health and management of her comorbidities as well as any new positive symptoms mentioned in review of systems above. Care was provided within the definitions and limitations of our specialty practice.  Encouraged lifestyle interventions including healthy habits, lifestyle changes, regular aerobic exercise and appropriate weight maintenance as advised by their primary care physician or cardiovascular health provider. Discussed well care and disease prevention/maintenance. All recommendations for therapy are provided to improve function with activities of daily living, decrease pain, and help develop an exercise program. All recommendations for medications are meant to help decrease pain, improve function with activities of daily living, maintain compliance with home exercise program, and improve quality of life. All recommendations for therapeutic injections are meant to help decrease pain, improve function with activities of daily living, maintain compliance with home exercise program, improve quality of life, and decrease reliance upon oral medications. Encouraged compliance with her home exercise program. Recommended compliance with physical therapy program as outlined above. Discussed the elevated risks of excessive sedation while on pain medications. Advised her against driving or operating heavy machinery or performing any activities where she may harm herself or others while on pain medications. Particular caution was emphasized especially during dose adjustments and medication changes. Discussed the risks of temporary disability, permanent disability, morbidity, and mortality with poorly-managed or undiagnosed medical conditions and comorbidities. Emphasized the importance of timely medical evaluation and treatment as previously recommended by us or other medical professionals. Risks of not pursing these recommendations were emphasized. Advised her that any lab testing, imaging, or other diagnostic test results are best discussed in person in the office so that we can provide a clear explanation of their significance and best treatment based upon these results. It is her responsibility to make and keep a follow up appointment to discuss these test results in person to discuss the significance of the findings and appropriate follow-up steps.  She expressed complete understanding and agreement with the entire plan as outlined above. Portions of this note may have been typed, auto-populated, dictated or transcribed by voice recognition resulting in errors, omissions, or close substitutions which may be missed despite careful proofreading. Please contact the author for any questions or concerns. Follow up:  Return in about 1 month (around 10/1/2021) for reassessment of pain and symptoms, P.T. Internal Ref. Roney Zepeda MD      >50% of 16 minute appointment was spent with discussion, addressing questions and concerns, including prognosis, treatment options, patient education, and recommendations. [id number]    Pursuant to the emergency declaration under the [facility], 1135 waiver authority and the [employer] Act, this Virtual  Visit was conducted, with patient's consent, to reduce the patient's risk of exposure to COVID-19 and provide continuity of care for an established patient. Services were provided through a video synchronous discussion virtually to substitute for in-person clinic visit.

## 2021-09-02 ENCOUNTER — TELEPHONE (OUTPATIENT)
Dept: PAIN MANAGEMENT | Age: 46
End: 2021-09-02

## 2021-09-02 NOTE — TELEPHONE ENCOUNTER
BILAT SHOULDER GLENOHUMERAL INJECTION    NO AUTH REQUIRED    OK to schedule procedure approved as above. Please note sides/levels approved and date range. (If applicable, sides/levels approved may differ from those ordered)    TO BE SCHEDULED WITH DR. Blas Felix

## 2021-09-02 NOTE — TELEPHONE ENCOUNTER
ORDER PLACED:    Date: 9/1/21  Description: BILAT SHOULDER GLENOHUMERAL INJECTION  Order Number: [id number]  Ordering Provider: [facility]  Performing Provider: CESAR  CPT Codes: 48735  ICD10 Codes: J19.946 Y19.479

## 2021-09-20 ENCOUNTER — TELEPHONE (OUTPATIENT)
Dept: PAIN MANAGEMENT | Age: 46
End: 2021-09-20

## 2021-09-20 NOTE — TELEPHONE ENCOUNTER
BENEFITS: NAFISA SHOULDER GLENOHUMERAL INJ    Insurance: [facility]  Phone: [phone]  Contact Name: Melina Borjas  Effective Date: 1.1.2021     Plan year: YES-CALENDAR  Deductible: 950.00      Deductible Met: 950.00  Allowed/benefits paid at: 100% AFTER DEDUCTIBLE  OOP: 5000.00 MET $2386.69  Freq Limits: 20610-BASED ON MEDICAL NECESSITY  Prior Auth Requirement: NO    Notes: NO PRE-EX CLAUSE    Call Reference #: [id number]    Time of call: 2:30PM

## 2021-10-04 ENCOUNTER — VIRTUAL VISIT (OUTPATIENT)
Dept: PAIN MANAGEMENT | Age: 46
End: 2021-10-04
Payer: COMMERCIAL

## 2021-10-04 DIAGNOSIS — M77.11 LATERAL EPICONDYLITIS OF BOTH ELBOWS: ICD-10-CM

## 2021-10-04 DIAGNOSIS — M25.512 CHRONIC PAIN OF BOTH SHOULDERS: ICD-10-CM

## 2021-10-04 DIAGNOSIS — M79.602 BILATERAL ARM PAIN: Primary | ICD-10-CM

## 2021-10-04 DIAGNOSIS — M25.511 CHRONIC PAIN OF BOTH SHOULDERS: ICD-10-CM

## 2021-10-04 DIAGNOSIS — M47.812 CERVICAL SPONDYLOSIS WITHOUT MYELOPATHY: ICD-10-CM

## 2021-10-04 DIAGNOSIS — M77.12 LATERAL EPICONDYLITIS OF BOTH ELBOWS: ICD-10-CM

## 2021-10-04 DIAGNOSIS — G89.29 CHRONIC PAIN OF BOTH SHOULDERS: ICD-10-CM

## 2021-10-04 DIAGNOSIS — M79.601 BILATERAL ARM PAIN: Primary | ICD-10-CM

## 2021-10-04 DIAGNOSIS — M43.10 RETROLISTHESIS OF VERTEBRAE: ICD-10-CM

## 2021-10-04 PROCEDURE — G8484 FLU IMMUNIZE NO ADMIN: HCPCS | Performed by: PHYSICAL MEDICINE & REHABILITATION

## 2021-10-04 PROCEDURE — G8427 DOCREV CUR MEDS BY ELIG CLIN: HCPCS | Performed by: PHYSICAL MEDICINE & REHABILITATION

## 2021-10-04 PROCEDURE — G8417 CALC BMI ABV UP PARAM F/U: HCPCS | Performed by: PHYSICAL MEDICINE & REHABILITATION

## 2021-10-04 PROCEDURE — 4004F PT TOBACCO SCREEN RCVD TLK: CPT | Performed by: PHYSICAL MEDICINE & REHABILITATION

## 2021-10-04 PROCEDURE — 99214 OFFICE O/P EST MOD 30 MIN: CPT | Performed by: PHYSICAL MEDICINE & REHABILITATION

## 2021-10-04 RX ORDER — TIZANIDINE 4 MG/1
4 TABLET ORAL 4 TIMES DAILY PRN
Qty: 120 TABLET | Refills: 0 | Status: SHIPPED | OUTPATIENT
Start: 2021-10-04 | End: 2021-11-02 | Stop reason: SDUPTHER

## 2021-10-04 ASSESSMENT — ENCOUNTER SYMPTOMS
NAUSEA: 0
CONSTIPATION: 0
DIARRHEA: 0
SHORTNESS OF BREATH: 0
BACK PAIN: 0

## 2021-10-04 NOTE — PROGRESS NOTES
Seth Seaman  (1975)    10/4/2021    TELEHEALTH EVALUATION -- Audio/Visual (During OBBSI-46 public health emergency)    Due to COVID 19 outbreak, patient's office visit was converted to a virtual visit. Patient was contacted and agreed to proceed with a virtual visit via audio-visual platform. Patient understands that this encounter is a billable visit and that insurance coverage and co-pays are up to their individual insurance plans. At the beginning of this telehealth encounter, I verified with the patient their name and date of birth. Verbal consent for telehealth encounter was obtained. Called on 10/4/2021 at 9:16 am and ended at 9:27 am.    Subjective:       Chief Complaint   Patient presents with    Neck Pain    Arm Pain       Seth Seaman is 48 y.o. female who complains today of:     Last called by me on 9/1/21: missed shoulder inj appointment, still interested in doing these. Had xr shoulders done, reviewed below. Didn't get to see Chelsi Wilhelm, didn't receive referral. No new therapy done, has upcoming eval appointment. Somehow didn't get xr order for cervical spine. Dog is getting better slowly. No other updates from other physicians, no emergency room visits. No other test therapy updates modifications, no emergency room visits. Gabapentin 800 mg 4 times daily and tizanidine 4 mg QID help with remaining functional with chores, personal hygiene, remaining compliant with home exercise program, maintaining her quality of life, and performing activities of daily living. She obtains greater than 50% pain relief without any significant side effects. She is clear to avoid driving or operating heavy machinery or to perform any activities where she may harm herself or others while on pain medications. Bilateral shoulder pain is a 4/10. Pain can get to a 8/10. Pain has been a constant ache for over 4 months.   The pain is worse with lifting things and using her arms, worse with cleaning house. The pain is better with rest and pain medicine. There are no other associated symptoms or contextual factors. She denies any classic radicular symptoms, new weakness, saddle anesthesia, bowel or bladder dysfunction, or falls. Bilateral arm pain is a 5/10. Gets to a 8/10. Located in the back of both arms and goes into both hands. Pain is worse with activity. Better with rest and pain medicine. It has been a constant ache for over 9 months. Right arm hurts more than left arm. There are no other associated symptoms or contextual factors. She denies any new weakness, saddle anesthesia, bowel or bladder dysfunction, or falls.      Elbow pain bilaterally is a 3/10. Pain is worse with work and cleaning her house She denies any clear joint related pain in her shoulders elbow or hands bilaterally. Allergies:  Bactrim [sulfamethoxazole-trimethoprim], Naproxen, Bupivacaine hcl, Lidocaine, and Penicillins    History:  History reviewed. No pertinent past medical history. Past Surgical History:   Procedure Laterality Date     SECTION      1993    HAND SURGERY      right hand     History reviewed. No pertinent family history.   Social History     Socioeconomic History    Marital status: Single     Spouse name: Not on file    Number of children: Not on file    Years of education: Not on file    Highest education level: Not on file   Occupational History    Not on file   Tobacco Use    Smoking status: Current Every Day Smoker     Packs/day: 1.00     Types: Cigarettes    Smokeless tobacco: Former User    Tobacco comment: 1 pack a day   Substance and Sexual Activity    Alcohol use: No    Drug use: Not on file    Sexual activity: Not on file   Other Topics Concern    Not on file   Social History Narrative    Not on file     Social Determinants of Health     Financial Resource Strain:     Difficulty of Paying Living Expenses:    Food Insecurity:     Worried About Running Out insight normal     [x] Breathing appears normal  [x] No rash, lesions or ulcers on visible skin    [] Cranial Nerves II-XII grossly intact    [] Motor grossly intact in visible upper extremities    [] Motor grossly intact in visible lower extremities    [] Normal gait and station   [] No digital cyanosis    [] OTHER:      Due to this being a TeleHealth encounter, evaluation of the following organ systems is limited: Vitals/Constitutional/EENT/Resp/CV/GI//MS/Neuro/Skin/Heme-Lymph-Imm. XR B Shoulder 10/1/21: No fracture. Left shoulder with with osteophyte formation AC joint. Right shoulder with bulbous subacromial process. MRI cervical spine 7/28/2021: Degenerative disease and facet arthropathy. No fracture. C1-2 normal.  C2-3 normal canal foramen. C3-4 normal canal foramen. C4-5 normal canal foramen. C5-6 mild canal stenosis, mild bilateral foraminal narrowing. C6-7 borderline canal stenosis, normal foramen. C7-T1 normal canal foramen. XR C-spine 6/17/2021: Degenerative disease and facet arthropathy,, no no fracture, C5-6 retrolisthesis. EMG B UE 2/22/2021: This study is normal. There is no current electrodiagnostic evidence for an active bilateral cervical motor radiculopathy, bilateral median mononeuropathy, or generalized large fiber sensorimotor peripheral polyneuropathy. US B Elbows 3/11/20: bilateral common extensor tendon tendinopathy. Normal ulnar nerve measurements. EMG B UE 1/17/20: This study is limited, but normal. There is no current electrodiagnostic evidence for a generalized large fiber sensorimotor peripheral polyneuropathy.  Although limited, there is no clear evidence for an active cervical motor radiculopathy. X-rays cervical spine 10/7/19: Degenerative disc disease C5-6.  No fracture. Assessment:      Diagnosis Orders   1. Bilateral arm pain     2. Cervical spondylosis without myelopathy     3. Lateral epicondylitis of both elbows     4. Chronic pain of both shoulders     5. Retrolisthesis of vertebrae       -Min relief with Meloxicam 7.5 mg BID, allergy with Naproxen. Plan:     Periodic Controlled Substance Monitoring: Obtaining appropriate analgesic effect of treatment. Lucinda Lopez MD)    No orders of the defined types were placed in this encounter. No orders of the defined types were placed in this encounter.    -Follow with primary care regarding elevated ALT liver enzyme test.  Follow-up with infectious disease as recommended. Encourate Physical therapy for bilateral shoulder pain  Reviewed XR B shoulders above, all questions answered  -Encourage orthopedic spine surgery for further evaluation and a second opinion, referral given to Dr oMnse Chavez XR C-spine flexion-extension eval instability, retrolisthesis C5-6. Clarified on the reason for flexion-extension films of her cervical spine. Encourage second opinion sports medicine and pain management for further evaluation of bilateral arm pain. Continue with PT and home exercise program   -Given Naproxen allergy with anaphylaxis, will hold on other NSAIDs trials. Min relife with Meloxicam 7.5 mg BID due to min relief  -Continue Gabapentin 800 mg QID, no ref (Nov 2021) [place] reviewed on 10/4/2021   -Continue Tizanidine 4 mg 4 times daily as needed, no ref . Avoid all other muscle relaxers and/or sedating medicines. -Encourage Neurology, sports medicine, and pain management 2nd opinions. Reschedule Bilateral shoulder glenohumeral joint corticosteroid injections under XR with Dr. Yasmine An. 15-minute procedure. Consider total of 12 mg betamethasone. Controlled Substance Monitoring:    Acute and Chronic Pain Monitoring:   RX Monitoring 10/4/2021   Periodic Controlled Substance Monitoring Obtaining appropriate analgesic effect of treatment. Discussed the risks, side effects, and symptoms that would warrant urgent or emergent physician evaluation of all medications prescribed today.  Discussed the risks of the above recommended procedures including but not limited to bleeding, infection, worsened pain, damage to surrounding structures, side effects, toxicity, allergic reactions to medications used, immune and stress-response dysfunction, fat necrosis, decreased bone mineralization, cartilage loss, increased fracture risk, avascular necrosis, skin pigmentation changes, blood sugar elevation, need for surgery, premature damage or degeneration of the joint, as well as catastrophic injury such as vision loss, paralysis, stroke, bowel or bladder incontinence, ventilator dependence, loss of use of the joint and/or extremity, and death. Discussed the risks, benefits, alternative procedures, and alternatives to the procedure including no procedure at all. Discussed that we cannot undo any permanent neurologic or orthopaedic damage or change the course of any underlying disease. After thorough discussion, patient expressed complete understanding and willingness to proceed. Provided education and counseling regarding the diagnosis, prognosis, and treatment options. All questions were answered. Encouraged her to follow-up with her primary care physician and/or specialists as required for her overall health and management of her comorbidities as well as any new positive symptoms mentioned in review of systems above. Care was provided within the definitions and limitations of our specialty practice. Encouraged lifestyle interventions including healthy habits, lifestyle changes, regular aerobic exercise and appropriate weight maintenance as advised by their primary care physician or cardiovascular health provider. Discussed well care and disease prevention/maintenance.      All recommendations for therapy are provided to improve function with activities of daily living, decrease pain, and help develop an exercise program. All recommendations for medications are meant to help decrease pain, improve function with activities of daily living, maintain compliance with home exercise program, and improve quality of life. All recommendations for therapeutic injections are meant to help decrease pain, improve function with activities of daily living, maintain compliance with home exercise program, improve quality of life, and decrease reliance upon oral medications. Encouraged compliance with her home exercise program. Recommended compliance with physical therapy program as outlined above. Discussed the elevated risks of excessive sedation while on pain medications. Advised her against driving or operating heavy machinery or performing any activities where she may harm herself or others while on pain medications. Particular caution was emphasized especially during dose adjustments and medication changes. Discussed the risks of temporary disability, permanent disability, morbidity, and mortality with poorly-managed or undiagnosed medical conditions and comorbidities. Emphasized the importance of timely medical evaluation and treatment as previously recommended by us or other medical professionals. Risks of not pursing these recommendations were emphasized. Advised her that any lab testing, imaging, or other diagnostic test results are best discussed in person in the office so that we can provide a clear explanation of their significance and best treatment based upon these results. It is her responsibility to make and keep a follow up appointment to discuss these test results in person to discuss the significance of the findings and appropriate follow-up steps. She expressed complete understanding and agreement with the entire plan as outlined above. Portions of this note may have been typed, auto-populated, dictated or transcribed by voice recognition resulting in errors, omissions, or close substitutions which may be missed despite careful proofreading.  Please contact the author for any questions or concerns. Follow up:  Return in about 1 month (around 11/4/2021) for reassessment of pain and symptoms. Margarito Terrell MD      >50% of 11 minute appointment was spent with discussion, addressing questions and concerns, including prognosis, treatment options, patient education, and recommendations. Pursuant to the emergency declaration under the [address] [place] [address], [facility] waiver authority and the [email or web address] and [employer] Act, this Virtual  Visit was conducted, with patient's consent, to reduce the patient's risk of exposure to COVID-19 and provide continuity of care for an established patient. Services were provided through a video synchronous discussion virtually to substitute for in-person clinic visit.

## 2021-10-08 ENCOUNTER — HOSPITAL ENCOUNTER (OUTPATIENT)
Dept: PHYSICAL THERAPY | Age: 46
Setting detail: THERAPIES SERIES
Discharge: HOME | End: 2021-10-08
Payer: COMMERCIAL

## 2021-10-08 DIAGNOSIS — M47.812 CERVICAL SPONDYLOSIS WITHOUT MYELOPATHY: ICD-10-CM

## 2021-10-08 DIAGNOSIS — M43.10 RETROLISTHESIS OF VERTEBRAE: ICD-10-CM

## 2021-10-08 PROCEDURE — 97162 PT EVAL MOD COMPLEX 30 MIN: CPT

## 2021-10-08 PROCEDURE — 97110 THERAPEUTIC EXERCISES: CPT

## 2021-10-08 ASSESSMENT — PAIN DESCRIPTION - LOCATION: LOCATION: SHOULDER;ARM

## 2021-10-08 ASSESSMENT — PAIN DESCRIPTION - ONSET: ONSET: AWAKENED FROM SLEEP

## 2021-10-08 ASSESSMENT — PAIN - FUNCTIONAL ASSESSMENT: PAIN_FUNCTIONAL_ASSESSMENT: PREVENTS OR INTERFERES WITH ALL ACTIVE AND SOME PASSIVE ACTIVITIES

## 2021-10-08 ASSESSMENT — PAIN DESCRIPTION - DESCRIPTORS: DESCRIPTORS: SHARP;SHOOTING;ACHING

## 2021-10-08 ASSESSMENT — PAIN DESCRIPTION - ORIENTATION: ORIENTATION: RIGHT;LEFT

## 2021-10-08 ASSESSMENT — PAIN DESCRIPTION - FREQUENCY: FREQUENCY: CONTINUOUS

## 2021-10-08 ASSESSMENT — PAIN SCALES - GENERAL: PAINLEVEL_OUTOF10: 6

## 2021-10-08 NOTE — PROGRESS NOTES
[facility] ([facility]) Physical Therapy-  [place]  PHYSICAL THERAPY EVALUATION    Date: 10/8/2021  Patient Name: Merline Rene       MRN: [id number]   Account: [de-identified]   : 1975  (48 y.o.)   Gender: female   Referring Practitioner: Dr Darnell Meyer                 Diagnosis: B Shoulder pain  Treatment Diagnosis: B shoulder/arm pain after activity limiting tolerance to tasks at times. Additional Pertinent Hx: R hand surgery with ORIF,             Past Medical History:  has no past medical history on file. Past Surgical History:   has a past surgical history that includes  section and Hand surgery. Vital Signs  Patient Currently in Pain: Yes   Pain Screening  Patient Currently in Pain: Yes  Pain Assessment  Pain Assessment: 0-10  Pain Level: 6 (worst pain on daily basis 10/10)  Pain Location: Shoulder;Arm  Pain Orientation: Right;Left  Pain Radiating Towards: lateral fingers  Pain Descriptors: Sharp; Shooting;Aching  Pain Frequency: Continuous  Pain Onset: Awakened from sleep  Functional Pain Assessment: Prevents or interferes with all active and some passive activities (Pt 25% normal function since 2019 with pain)           Lives With: Alone  ADL Assistance: Independent (difficulty washing hair)  Homemaking Assistance: Independent  Homemaking Responsibilities: Yes  Ambulation Assistance: Independent  Transfer Assistance: Independent  Active : Yes  Mode of Transportation: Car  Occupation: Full time employment  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 Pain mostly occurs after activity. NT intermittent and increases with pain. Pain and numbness radiates lateral fingers B. Comments: RTD 10/13 for cortisone injections B shoulders per pt.     Objective:      Strength RUE  Comment: Shoulder, elbow, wrist 4/5, Scap LT,MT 4-/5  Strength LUE  Comment: Shoulder, elbow, wrist 4/5, Scap LT,MT 4-/5  Strength Other  Other:  40# R, 45 # L, Pt is L hand dominant     AROM RUE (degrees)  RUE AROM : WNL  RUE General AROM: Shoulder to wrist  AROM LUE (degrees)  LUE AROM : WNL  LUE General AROM: Shoulder to wrist    Spine  Cervical: Flex 28, 55 Ext, B SB 35  Special Tests: compression(-), distraction(-), spurlings(-),  sharp-thu(-), Modified DeKlyn's(-),Pratt-Jerry(), Speeds(pain in R forearm), Empty can(-), Drop arm (-), Neer's(-), Jakob's lift off(-), AC compression(min discomfort R), Clunk(-), Apprehension(-),    Observation/Palpation  Posture: Fair  Palpation: R UT region and B forearms tenderness and tightness B UT and cervical paraspinal and scapular region. Observation: Fwd head, rounded shoulders, protracted scap  Scar: 2016 burns on L UE, upper back with decreased sensation     Exercises:   Exercises  Exercise 1: UBE*  Exercise 2: scap retraction 5 s x 5  Exercise 3: bow and arrow GTB x 10 B  Exercise 4: B ER GTB need vc to keep elbows at side x 10  Exercise 5: midrow/ lat pull*  Exercise 6: serratus punch*  Exercise 7: serratus reach up and diagonal facing wall*  Exercise 20: HEP: bow and arrow, B ER, scap retraction  Modalities:  Modalities  Moist heat: *  Cryotherapy (Minutes\Location): *  E-stim (parameters): *  Other: trial cupping/IDN if needed for muscle tightness and pain control. Manual:     *Indicates exercise,modality, or manual techniques to be initiated when appropriate  Assessment: Body structures, Functions, Activity limitations: Decreased functional mobility , Decreased ROM, Decreased strength, Decreased posture, Increased pain  Assessment: Pt is a 47 yo female referred to Physical Therapy with noted B shoulder pain. Upon evaluation, pt demonstrates decreased B UE and scap strength, and pain GH joint near end ranges and tenderness in forerams.  Pt will benefit from continued skilled therapy services to address noted deficits while establishing a home exercise program for optimal symptom management and improving function  Prognosis: Good  Discharge Recommendations: Continue to assess pending progress        Decision Making: Medium Complexity  History: R hand surgery with ORIF  Exam: UEFI 59/80=74% functional  Clinical Presentation: evolving        Plan  Frequency/Duration:  Plan  Times per week: 2  Plan weeks: 4  Current Treatment Recommendations: Strengthening, Modalities, Home Exercise Program, Integrated Dry Needling, Manual Therapy - Soft Tissue Mobilization, Neuromuscular Re-education  Plan Comment: Pt reports may be difficult to attend appts due to work schedule. Patient Education  New Education Provided: PT Education: Goals;PT Role;Plan of Care;Home Exercise Program  Patient Education: written HEP provided    POST-PAIN     Pain Rating (0-10 pain scale): No change /10  Location and pain description same as pre-treatment unless indicated. Action: [x] NA  [] Call Physician  [] Perform HEP  [] Meds as prescribed    Evaluation and patient rights have been reviewed and patient agrees with plan of care. Yes  [x]  No  []   Explain:       David Fall Risk Assessment  Risk Factor Scale  Score   History of Falls [x] Yes  [] No 25  0 25   Secondary Diagnosis [] Yes  [x] No 15  0 0   Ambulatory Aid [] Furniture  [] Crutches/cane/walker  [x] None/bedrest/wheelchair/nurse 30  15  0 0   IV/Heparin Lock [] Yes  [x] No 20  0 0   Gait/Transferring [] Impaired  [] Weak  [x] Normal/bedrest/immobile 20  10  0 0   Mental Status [] Forgets limitations  [x] Oriented to own ability 15  0 0      Total:25     Based on the Assessment score: check the appropriate box.   []  No intervention needed   Low =   Score of 0-24  [x]  Use standard prevention interventions Moderate =  Score of 24-44   [x] Discuss fall prevention strategies   [x] Indicate moderate falls risk on eval  []  Use high risk prevention interventions High = Score of 45 and higher   [] Discuss fall prevention strategies   [] Provide supervision during treatment time    Goals  Long term goals  Time Frame for Long term goals : 4 weeks  Long term goal 1: Indep HEP for symptom management  Long term goal 2: Pt demo improved overall function by reporting greater than 80% per functional survey score  Long term goal 3: Improve B LT and MT strength 4/5 to allow patient to improve posture awareness in sitting and during activity.     PT Individual Minutes  Time In: 0800  Time Out: 4292  Minutes: 50  Timed Code Treatment Minutes: 10 Minutes  Procedure Minutes:40 min eval     Timed Activity Minutes Units   Ther Ex 10 1       Electronically signed by Javier Prieto, PT on 10/8/21 at 9:38 AM EDT

## 2021-10-08 NOTE — PROGRESS NOTES
[address]  [address]  [phone]    [] Certification  [] Recertification [x]  Plan of Care  [] Progress Note [] Discharge      To:  Dr Emma Ochoa      From:  Yara Santana, PT  Patient: Nika Pérez     : 1975  Diagnosis: B Shoulder pain     Date: 10/8/2021  Treatment Diagnosis: B shoulder/arm pain after activity limiting tolerance to tasks at times. Progress Report Period from:  10/8/2021  to 10/8/2021    Total # of Visits to Date: 1   No Show: 0    Canceled Appointment: 0     OBJECTIVE:   Short Term goals=Long Term Goals - Time Frame for Long term goals : 4 weeks  Goals Current/ Discharge status Met   Long term goal 1: Indep HEP for symptom management Written HEP provided  for symptom management   [] yes  [x] no   Long term goal 2: Pt demo improved overall function by reporting greater than 80% per functional survey score Exam: UEFI 59/80=74% functional   [] yes  [x] no   Long term goal 3: Improve B LT and MT strength 4/5 to allow patient to improve posture awareness in sitting and during activity. Strength RUE  Comment: Shoulder, elbow, wrist 4/5, Scap LT,MT 4-/5  Strength LUE  Comment: Shoulder, elbow, wrist 4/5, Scap LT,MT 4-/5   [] yes  [x] no       Body structures, Functions, Activity limitations: Decreased functional mobility , Decreased ROM, Decreased strength, Decreased posture, Increased pain  Assessment: Pt is a 47 yo female referred to Physical Therapy with noted B shoulder pain. Upon evaluation, pt demonstrates decreased B UE and scap strength, and pain GH joint near end ranges and tenderness in forerams.  Pt will benefit from continued skilled therapy services to address noted deficits while establishing a home exercise program for optimal symptom management and improving function  Prognosis: Good  Discharge Recommendations: Continue to assess pending progress    Special Tests: compression(-), distraction(-), spurlings(-),  sharp-thu(-), Modified DeKlyn's(-),Pratt-Jerry(), Speeds(pain in R forearm), Empty can(-), Drop arm (-), Neer's(-), [place]'s lift off(-), AC compression(min discomfort R), Clunk(-), Apprehension(-),       PT Education: Goals;PT Role;Plan of Care;Home Exercise Program  Patient Education: written HEP provided    PLAN: [x] Evaluate and Treat  Frequency/Duration:  Plan  Times per week: 2  Plan weeks: 4  Current Treatment Recommendations: Strengthening, Modalities, Home Exercise Program, Integrated Dry Needling, Manual Therapy - Soft Tissue Mobilization, Neuromuscular Re-education  Plan Comment: Pt reports may be difficult to attend appts due to work schedule. Patient Status:[x] Continue/ Initiate plan of Care    [] Discharge PT. Recommend pt continue with HEP. [] Additional visits requested, Please re-certify for additional visits:          Signature: Electronically signed by Isabel Gonzalez PT on 10/8/21 at 9:41 AM EDT      If you have any questions or concerns, please don't hesitate to call. Thank you for your referral.    I have reviewed this plan of care and certify a need for medically necessary rehabilitation services.     Physician Signature:__________________________________________________________  Date:  Please sign and return

## 2021-10-13 ENCOUNTER — PROCEDURE VISIT (OUTPATIENT)
Dept: PAIN MANAGEMENT | Age: 46
End: 2021-10-13
Payer: COMMERCIAL

## 2021-10-13 ENCOUNTER — HOSPITAL ENCOUNTER (OUTPATIENT)
Dept: PHYSICAL THERAPY | Age: 46
Setting detail: THERAPIES SERIES
Discharge: HOME | End: 2021-10-13
Payer: COMMERCIAL

## 2021-10-13 DIAGNOSIS — M25.511 CHRONIC PAIN OF BOTH SHOULDERS: ICD-10-CM

## 2021-10-13 DIAGNOSIS — G89.29 CHRONIC PAIN OF BOTH SHOULDERS: ICD-10-CM

## 2021-10-13 DIAGNOSIS — M25.512 CHRONIC PAIN OF BOTH SHOULDERS: ICD-10-CM

## 2021-10-13 PROCEDURE — 77002 NEEDLE LOCALIZATION BY XRAY: CPT | Performed by: PHYSICAL MEDICINE & REHABILITATION

## 2021-10-13 PROCEDURE — 20610 DRAIN/INJ JOINT/BURSA W/O US: CPT | Performed by: PHYSICAL MEDICINE & REHABILITATION

## 2021-10-13 PROCEDURE — G0283 ELEC STIM OTHER THAN WOUND: HCPCS

## 2021-10-13 PROCEDURE — 97110 THERAPEUTIC EXERCISES: CPT

## 2021-10-13 RX ORDER — LIDOCAINE HYDROCHLORIDE 10 MG/ML
10 INJECTION, SOLUTION EPIDURAL; INFILTRATION; INTRACAUDAL; PERINEURAL ONCE
Status: SHIPPED | OUTPATIENT
Start: 2021-10-13

## 2021-10-13 RX ORDER — BETAMETHASONE SODIUM PHOSPHATE AND BETAMETHASONE ACETATE 3; 3 MG/ML; MG/ML
12 INJECTION, SUSPENSION INTRA-ARTICULAR; INTRALESIONAL; INTRAMUSCULAR; SOFT TISSUE ONCE
Status: COMPLETED | OUTPATIENT
Start: 2021-10-13 | End: 2021-10-19

## 2021-10-13 ASSESSMENT — PAIN SCALES - GENERAL: PAINLEVEL_OUTOF10: 7

## 2021-10-13 ASSESSMENT — PAIN DESCRIPTION - FREQUENCY: FREQUENCY: CONTINUOUS

## 2021-10-13 ASSESSMENT — PAIN DESCRIPTION - DESCRIPTORS: DESCRIPTORS: ACHING;TENDER

## 2021-10-13 ASSESSMENT — PAIN DESCRIPTION - ORIENTATION: ORIENTATION: RIGHT;LEFT

## 2021-10-13 ASSESSMENT — PAIN DESCRIPTION - LOCATION: LOCATION: SHOULDER;ARM

## 2021-10-13 NOTE — PROGRESS NOTES
[address]  [address]:[phone]        Date: 10/13/2021  Patient: Citlali Roldan  : 1975  ACCT #: [de-identified]  Referring Practitioner: Dr Bj Ray  Diagnosis: B Shoulder pain  Treatment Diagnosis: B shoulder/arm pain after activity limiting tolerance to tasks at times. Visit Information:  PT Visit Information  Onset Date: 19  PT Insurance Information: [facility]  Total # of Visits Approved: 60 ($40 copay)  Total # of Visits to Date: 2  No Show: 0  Canceled Appointment: 0  Progress Note Counter:     Subjective: Pt states that her shoulders are really hurting today for some reason. She does not sleep well at night due to the constant \"pins and needles\" in her arms. It doesn't bother her as much during the day. Comments: RTD 10/13 for cortisone injections B shoulders per pt. HEP Compliance:  [x] Good  [] Fair  [] Poor [] Reports not doing due to:    Vital Signs  Patient Currently in Pain: Yes   Pain Screening  Patient Currently in Pain: Yes  Pain Assessment  Pain Assessment: 0-10  Pain Level: 7  Pain Location: Shoulder;Arm  Pain Orientation: Right;Left  Pain Descriptors: Aching;Tender  Pain Frequency: Continuous    OBJECTIVE:   Exercises  Exercise 1: UBE 2' forward / 2' backward  Exercise 2: scap retraction 10 x 5\"  Exercise 3: bow and arrow GTB x 10 ea  Exercise 4: B ER YTB need vc to keep elbows at side x 10  Exercise 5: midrow/ lat pull GTB x 10 ea  Exercise 6: serratus punch with YTB x 10 ea  Exercise 7: serratus reach up with YTB x 10 / diagonal facing wall with YTB x 10  Exercise 8: IR with GTB x 10 ea  Exercise 20: HEP: bow and arrow, B ER, scap retraction.  Updated HEP on 10/13/21: tband IR, serratus punch/reach, midrow/lat pull    Modalities:  Modalities  Moist heat: *  Cryotherapy (Minutes\Location): *  E-stim (parameters): Premod E-stim to B shoulders to decrease pain x 10'  Other: trial cupping/IDN if needed for muscle tightness and pain control. *Indicates exercise, modality, or manual techniques to be initiated when appropriate    Assessment: Body structures, Functions, Activity limitations: Decreased functional mobility , Decreased ROM, Decreased strength, Decreased posture, Increased pain  Assessment: Initiated B shoulder stab therex this date. Instructed in serratus punch, reach up/diagonally, IR, and midrow/lat pull using tband resistance. Challenged with ex's but tolerated well overall. Updated written HEP. Applied E-stim to offer relief. Treatment Diagnosis: B shoulder/arm pain after activity limiting tolerance to tasks at times. Prognosis: Good    Goals:  Long term goals  Time Frame for Long term goals : 4 weeks  Long term goal 1: Indep HEP for symptom management  Long term goal 2: Pt demo improved overall function by reporting greater than 80% per functional survey score  Long term goal 3: Improve B LT and MT strength 4/5 to allow patient to improve posture awareness in sitting and during activity. Progress toward goals: Began treatment this date. Issued updated HEP. POST-PAIN       Pain Rating (0-10 pain scale):   7/10   Location and pain description same as pre-treatment unless indicated. Action: [] NA   [x] Perform HEP  [x] Meds as prescribed  [] Modalities as prescribed   [] Call Physician     Frequency/Duration:  Plan  Times per week: 2  Plan weeks: 4  Current Treatment Recommendations: Strengthening, Modalities, Home Exercise Program, Integrated Dry Needling, Manual Therapy - Soft Tissue Mobilization, Neuromuscular Re-education  Plan Comment: Pt reports may be difficult to attend appts due to work schedule. Pt to continue current HEP. See objective section for any therapeutic exercise changes, additions or modifications this date.     PT Individual Minutes  Time In: 1335  Time Out: 1430  Minutes: 55  Timed Code Treatment Minutes: 43 Minutes  Procedure Minutes: E-stim x 10' Timed Activity Minutes Units   Ther Ex 43 3       Signature:  Electronically signed by Lynne Palomo PTA on 10/13/21 at 1:37 PM EDT

## 2021-10-13 NOTE — PROGRESS NOTES
Shoulder Glenohumeral Joint Injection           Patient Name: Sherrie Cuba   : 1975  Date: 10/13/2021     Provider: Rigoberto Sequeira MD        PROCEDURE: Bilateral glenohumeral joint shoulder corticosteroid injection under fluoroscopic guidance    INDICATIONS: Sherrie Cuba is a 48 y.o. female who presents with symptoms and physical exam findings consistent with bilateral shoulder pain. She has had persistent pain that limits her activities of daily living such as upper body dressing. The pain is persistent despite conservative measures including home exercise program. Given her symptoms, physical exam findings, impairment in activities of daily living, and lack of response to conservative measures, consideration for Bilateral glenohumeral joint shoulder corticosteroid injection under fluoroscopic guidance was given. Discussed the risks including but not limited to bleeding, infection, worsened pain, damage to surrounding structures, side effects, toxicity, allergic reactions to medications used, immune and stress-response dysfunction, fat necrosis, decreased bone mineralization, cartilage loss, increased fracture risk, avascular necrosis, skin pigmentation changes, blood sugar elevation, need for surgery, premature damage or degeneration of the joint, pneumothorax, as well as catastrophic injury such as vision loss, paralysis, spinal cord or plexus injury, stroke, bowel or bladder incontinence, ventilator dependence, loss of use of the joint and/or extremity, and death. Discussed the risks, benefits, alternative procedures, and alternatives to the procedure including no procedure at all. Discussed that we cannot undo any permanent neurologic or orthopaedic damage or change the course of any underlying disease. After thorough discussion, patient expressed understanding and willingness to proceed. Written consent was obtained and is in the chart.  Verbal consent to proceed was obtained. DESCRIPTION OF PROCEDURE:  The sites were marked. A time-out was performed. Fluoroscopy was utilized to visualize the pertinent anatomy. The sites were then cleaned with Betadine three times and maintained in a sterile manner throughout the procedure. Attention was turned to the left shoulder. Then a 27-gauge 1.5 inch needle was used to anesthetize the skin and subcutaneous tissue with 1 mL of 1% preservative-free lidocaine and 0.5 mEq sodium bicarbonate. The needle was then advanced under fluoroscopic guidance onto the humeral head. Aspiration for blood or synovial fluid was negative. Then 5 mL injectate containing 1 mL of 6 mg of betamethasone and 4 mL of 1% preservative-free lidocaine was injected without difficulty or resistance. The needle was flushed and removed. The site was hemostatic. The site was cleaned and appropriately dressed. Attention was turned to the right shoulder. Then a 27-gauge 1.5 inch needle was used to anesthetize the skin and subcutaneous tissue with 1 mL of 1% preservative-free lidocaine and 0.5 mEq sodium bicarbonate. The needle was then advanced under fluoroscopic guidance onto the humeral head. Aspiration for blood or synovial fluid was negative. Then 5 mL injectate containing 1 mL of 6 mg of betamethasone and 4 mL of 1% preservative-free lidocaine was injected without difficulty or resistance. A total of 12 mg of betamethasone was used for today's procedure. The needle was flushed and removed. The site was hemostatic. The site was cleaned and appropriately dressed. The patient tolerated the procedure well. There were no immediate post procedure complications. Post procedure instructions were given. The patient was monitored for a short period of time and discharged home with her baseline neurologic and orthopaedic exam. The patient will follow-up as previously instructed.          [address]  Phone [phone]/LWF [phone]

## 2021-10-14 ENCOUNTER — APPOINTMENT (OUTPATIENT)
Dept: PHYSICAL THERAPY | Age: 46
End: 2021-10-14
Payer: COMMERCIAL

## 2021-10-19 ENCOUNTER — HOSPITAL ENCOUNTER (OUTPATIENT)
Dept: PHYSICAL THERAPY | Age: 46
Setting detail: THERAPIES SERIES
Discharge: HOME | End: 2021-10-19
Payer: COMMERCIAL

## 2021-10-19 PROCEDURE — G0283 ELEC STIM OTHER THAN WOUND: HCPCS

## 2021-10-19 PROCEDURE — 97110 THERAPEUTIC EXERCISES: CPT

## 2021-10-19 RX ADMIN — BETAMETHASONE SODIUM PHOSPHATE AND BETAMETHASONE ACETATE 12 MG: 3; 3 INJECTION, SUSPENSION INTRA-ARTICULAR; INTRALESIONAL; INTRAMUSCULAR; SOFT TISSUE at 11:06

## 2021-10-19 ASSESSMENT — PAIN DESCRIPTION - ORIENTATION: ORIENTATION: RIGHT;LEFT

## 2021-10-19 ASSESSMENT — PAIN DESCRIPTION - LOCATION: LOCATION: SHOULDER;ARM

## 2021-10-19 ASSESSMENT — PAIN SCALES - GENERAL: PAINLEVEL_OUTOF10: 4

## 2021-10-19 NOTE — PROGRESS NOTES
[address]  [address]  YXDLL:[phone]        Date: 10/19/2021  Patient: Phoenix Siu  : 1975   ACCT #: [de-identified]  Referring Practitioner: Dr Yue Austin  Diagnosis: B Shoulder pain  Treatment Diagnosis: B shoulder/arm pain after activity limiting tolerance to tasks at times. Visit Information:  PT Visit Information  Onset Date: 19  PT Insurance Information: [facility]  Total # of Visits Approved: 60 ($40 copay)  Total # of Visits to Date: 3  No Show: 0  Canceled Appointment: 0  Progress Note Counter: 3/12    Subjective: Pt states that her shoulers feel better today from getting cortisone injections in her shoulders last week. She is sleeping better too and not getting the \"pins and needles\" in her arms at night anymore which was her biggest complaint. Comments: RTD - none on file     HEP Compliance:  [] Good  [x] Fair  [] Poor [] Reports not doing due to:    Vital Signs  Patient Currently in Pain: Yes   Pain Screening  Patient Currently in Pain: Yes  Pain Assessment  Pain Assessment: 0-10  Pain Level: 4  Pain Location: Shoulder;Arm  Pain Orientation: Right;Left    OBJECTIVE:   Exercises  Exercise 1: UBE 2' forward / 2' backward  Exercise 2: scap retraction 10 x 5\"  Exercise 3: bow and arrow GTB x 10 ea  Exercise 4: B ER YTB need vc to keep elbows at side x 10  Exercise 5: midrow/ lat pull GTB x 10 ea  Exercise 6: serratus punch with YTB x 10 ea  Exercise 7: serratus reach up with YTB x 10 / diagonal facing wall with YTB x 5 ea  Exercise 8: IR with GTB x 10 ea  Exercise 9: chest pulls with YTB 10 x 3\"  Exercise 20: HEP: bow and arrow, B ER, scap retraction.  Updated HEP on 10/13/21: tband IR, serratus punch/reach, midrow/lat pull    Modalities:  Modalities  E-stim (parameters): Premod E-stim to B shoulders to decrease pain x 10'  Other: cupping to L UT/shoulder to reduce tightness and tissue restrictions, 2 cups, 2 pumps x 4'     Verbal education and a written handout provided to patient regarding myofacial decompression techniques with cupping. Side effects and potential risks explained and questions answered. *Indicates exercise, modality, or manual techniques to be initiated when appropriate    Assessment: Body structures, Functions, Activity limitations: Decreased functional mobility , Decreased ROM, Decreased strength, Decreased posture, Increased pain  Assessment: Reviewed ex's from previous session. Performed serratus punch, reach up/diagonally, IR, and midrow/lat pull using tband resistance. Challenged with ex's but tolerated well overall. Updated written HEP. Applied E-stim to offer relief. Treatment Diagnosis: B shoulder/arm pain after activity limiting tolerance to tasks at times. Prognosis: Good    Goals:  Long term goals  Time Frame for Long term goals : 4 weeks  Long term goal 1: Indep HEP for symptom management  Long term goal 2: Pt demo improved overall function by reporting greater than 80% per functional survey score  Long term goal 3: Improve B LT and MT strength 4/5 to allow patient to improve posture awareness in sitting and during activity. Progress toward goals: ongoing, pt compliant with HEP    POST-PAIN       Pain Rating (0-10 pain scale):   3/10   Location and pain description same as pre-treatment unless indicated. Action: [] NA   [x] Perform HEP  [] Meds as prescribed  [] Modalities as prescribed   [] Call Physician     Frequency/Duration:  Plan  Times per week: 2  Plan weeks: 4  Current Treatment Recommendations: Strengthening, Modalities, Home Exercise Program, Integrated Dry Needling, Manual Therapy - Soft Tissue Mobilization, Neuromuscular Re-education  Plan Comment: Pt reports may be difficult to attend appts due to work schedule. Pt to continue current HEP. See objective section for any therapeutic exercise changes, additions or modifications this date.     PT Individual Minutes  Time In: 1545  Time Out: 1642  Minutes: 57  Timed Code Treatment Minutes: 44 Minutes  Procedure Minutes: E-stim x 10'      Timed Activity Minutes Units   Ther Ex 40 3   Manual (cupping) 4 0       Signature:  Electronically signed by Jenn Chand PTA on 10/19/21 at 4:05 PM EDT

## 2021-10-21 ENCOUNTER — HOSPITAL ENCOUNTER (OUTPATIENT)
Dept: PHYSICAL THERAPY | Age: 46
Setting detail: THERAPIES SERIES
Discharge: HOME | End: 2021-10-21
Payer: COMMERCIAL

## 2021-10-21 PROCEDURE — G0283 ELEC STIM OTHER THAN WOUND: HCPCS

## 2021-10-21 PROCEDURE — 97110 THERAPEUTIC EXERCISES: CPT

## 2021-10-21 ASSESSMENT — PAIN DESCRIPTION - FREQUENCY: FREQUENCY: CONTINUOUS

## 2021-10-21 ASSESSMENT — PAIN DESCRIPTION - DESCRIPTORS: DESCRIPTORS: ACHING;TENDER

## 2021-10-21 ASSESSMENT — PAIN DESCRIPTION - LOCATION: LOCATION: SHOULDER;ARM

## 2021-10-21 ASSESSMENT — PAIN DESCRIPTION - DIRECTION: RADIATING_TOWARDS: FINGERS

## 2021-10-21 ASSESSMENT — PAIN SCALES - GENERAL: PAINLEVEL_OUTOF10: 4

## 2021-10-21 ASSESSMENT — PAIN DESCRIPTION - ORIENTATION: ORIENTATION: RIGHT;LEFT

## 2021-10-26 ENCOUNTER — HOSPITAL ENCOUNTER (OUTPATIENT)
Dept: PHYSICAL THERAPY | Age: 46
Setting detail: THERAPIES SERIES
Discharge: HOME | End: 2021-10-26
Payer: COMMERCIAL

## 2021-10-26 PROCEDURE — G0283 ELEC STIM OTHER THAN WOUND: HCPCS

## 2021-10-26 PROCEDURE — 97110 THERAPEUTIC EXERCISES: CPT

## 2021-10-26 ASSESSMENT — PAIN DESCRIPTION - ORIENTATION: ORIENTATION: LEFT

## 2021-10-26 ASSESSMENT — PAIN DESCRIPTION - LOCATION: LOCATION: SHOULDER;ARM

## 2021-10-26 ASSESSMENT — PAIN SCALES - GENERAL: PAINLEVEL_OUTOF10: 5

## 2021-10-26 ASSESSMENT — PAIN DESCRIPTION - DESCRIPTORS: DESCRIPTORS: TIGHTNESS

## 2021-10-26 ASSESSMENT — PAIN DESCRIPTION - FREQUENCY: FREQUENCY: CONTINUOUS

## 2021-11-02 ENCOUNTER — HOSPITAL ENCOUNTER (OUTPATIENT)
Dept: PHYSICAL THERAPY | Age: 46
Setting detail: THERAPIES SERIES
Discharge: HOME | End: 2021-11-02
Payer: COMMERCIAL

## 2021-11-02 ENCOUNTER — VIRTUAL VISIT (OUTPATIENT)
Dept: PAIN MANAGEMENT | Age: 46
End: 2021-11-02
Payer: COMMERCIAL

## 2021-11-02 DIAGNOSIS — M25.511 CHRONIC PAIN OF BOTH SHOULDERS: Primary | ICD-10-CM

## 2021-11-02 DIAGNOSIS — M47.812 CERVICAL SPONDYLOSIS WITHOUT MYELOPATHY: ICD-10-CM

## 2021-11-02 DIAGNOSIS — M25.512 CHRONIC PAIN OF BOTH SHOULDERS: Primary | ICD-10-CM

## 2021-11-02 DIAGNOSIS — G89.29 CHRONIC PAIN OF BOTH SHOULDERS: Primary | ICD-10-CM

## 2021-11-02 DIAGNOSIS — M77.11 LATERAL EPICONDYLITIS OF BOTH ELBOWS: ICD-10-CM

## 2021-11-02 DIAGNOSIS — M77.12 LATERAL EPICONDYLITIS OF BOTH ELBOWS: ICD-10-CM

## 2021-11-02 PROBLEM — N60.19 CHRONIC MASTITIS: Status: ACTIVE | Noted: 2021-11-02

## 2021-11-02 PROBLEM — F51.02 ADJUSTMENT INSOMNIA: Status: ACTIVE | Noted: 2021-11-02

## 2021-11-02 PROBLEM — M54.2 CHRONIC NECK PAIN: Status: ACTIVE | Noted: 2021-11-02

## 2021-11-02 PROBLEM — M35.3 POLYMYALGIA (HCC): Status: ACTIVE | Noted: 2021-11-02

## 2021-11-02 PROBLEM — E55.9 VITAMIN D DEFICIENCY: Status: ACTIVE | Noted: 2021-11-02

## 2021-11-02 PROBLEM — E78.2 MIXED HYPERLIPIDEMIA: Status: ACTIVE | Noted: 2021-11-02

## 2021-11-02 PROCEDURE — 4004F PT TOBACCO SCREEN RCVD TLK: CPT | Performed by: NURSE PRACTITIONER

## 2021-11-02 PROCEDURE — G8427 DOCREV CUR MEDS BY ELIG CLIN: HCPCS | Performed by: NURSE PRACTITIONER

## 2021-11-02 PROCEDURE — 99213 OFFICE O/P EST LOW 20 MIN: CPT | Performed by: NURSE PRACTITIONER

## 2021-11-02 PROCEDURE — G0283 ELEC STIM OTHER THAN WOUND: HCPCS

## 2021-11-02 PROCEDURE — 97110 THERAPEUTIC EXERCISES: CPT

## 2021-11-02 PROCEDURE — G8417 CALC BMI ABV UP PARAM F/U: HCPCS | Performed by: NURSE PRACTITIONER

## 2021-11-02 PROCEDURE — G8482 FLU IMMUNIZE ORDER/ADMIN: HCPCS | Performed by: NURSE PRACTITIONER

## 2021-11-02 RX ORDER — BUPROPION HYDROCHLORIDE 150 MG/1
TABLET, EXTENDED RELEASE ORAL
COMMUNITY
Start: 2021-08-08

## 2021-11-02 RX ORDER — ESCITALOPRAM OXALATE 10 MG/1
TABLET ORAL
COMMUNITY
Start: 2020-11-11

## 2021-11-02 RX ORDER — TIZANIDINE 4 MG/1
4 TABLET ORAL 4 TIMES DAILY PRN
Qty: 120 TABLET | Refills: 0 | Status: SHIPPED | OUTPATIENT
Start: 2021-11-02 | End: 2022-01-05 | Stop reason: SDUPTHER

## 2021-11-02 ASSESSMENT — ENCOUNTER SYMPTOMS
SHORTNESS OF BREATH: 0
TROUBLE SWALLOWING: 0
BACK PAIN: 0
CONSTIPATION: 0
COUGH: 0
DIARRHEA: 0
EYES NEGATIVE: 1
GASTROINTESTINAL NEGATIVE: 1

## 2021-11-02 ASSESSMENT — PAIN DESCRIPTION - ORIENTATION: ORIENTATION: LEFT;RIGHT

## 2021-11-02 ASSESSMENT — PAIN SCALES - GENERAL: PAINLEVEL_OUTOF10: 6

## 2021-11-02 ASSESSMENT — PAIN DESCRIPTION - LOCATION: LOCATION: ARM;SHOULDER

## 2021-11-02 ASSESSMENT — PAIN DESCRIPTION - FREQUENCY: FREQUENCY: CONTINUOUS

## 2021-11-02 ASSESSMENT — PAIN DESCRIPTION - DESCRIPTORS: DESCRIPTORS: TIGHTNESS

## 2021-11-02 NOTE — PROGRESS NOTES
Piyush Lindsey  [address]  KBQUD:[phone]        Date: 2021  Patient: Jackie Urbina  : 1975  ACCT #: [de-identified]  Referring Practitioner: Dr Rangel Found  Diagnosis: B Shoulder pain  Treatment Diagnosis: B shoulder/arm pain after activity limiting tolerance to tasks at times. Visit Information:  PT Visit Information  Onset Date: 19  PT Insurance Information: [facility]  Total # of Visits Approved: 60 ($40 copay)  Total # of Visits to Date: 6  No Show: 0  Canceled Appointment: 0  Progress Note Counter:     Subjective: Pt states that both of her shoulders are really tight today. She's also had a migraine headache all day today. Comments: RTD - none on file    Compliance:  [x] Good  [] Fair  [] Poor [] Reports not doing due to:    Vital Signs  Patient Currently in Pain: Yes   Pain Screening  Patient Currently in Pain: Yes  Pain Assessment  Pain Assessment: 0-10  Pain Level: 6  Pain Location: Arm; Shoulder  Pain Orientation: Left;Right  Pain Descriptors: Tightness  Pain Frequency: Continuous    OBJECTIVE:   Exercises  Exercise 1: UBE 2' forward / 2' backward  Exercise 2: midrow/ lat pull GTB x 10 ea  Exercise 3: bow and arrow GTB x 10 ea  Exercise 4: IR with GTB x 10 ea  Exercise 5: B ER YTB x 10  Exercise 6: chest pulls with YTB 10 x 3\"  Exercise 7: serratus punch with YTB x 10 ea  Exercise 8: serratus reach up with YTB x 10 / diagonal facing wall with YTB x 5  Exercise 20: HEP: bow and arrow, B ER, scap retraction. Updated HEP on 10/13/21: tband IR, serratus punch/reach, midrow/lat pull    Modalities:  Modalities  E-stim (parameters): IFC to B UT's/scap to decrease pain x 10'     *Indicates exercise, modality, or manual techniques to be initiated when appropriate    Assessment:    Body structures, Functions, Activity limitations: Decreased functional mobility , Decreased ROM, Decreased strength, Decreased posture, Increased pain  Assessment: Pt performed scapular strengtheing ex's utilizing tband resistance. More challenged with ex's today d/t increased pain entering clinic. Applied E-stim to offer relief. Treatment Diagnosis: B shoulder/arm pain after activity limiting tolerance to tasks at times. Prognosis: Good    Goals:  Long term goals  Time Frame for Long term goals : 4 weeks  Long term goal 1: Indep HEP for symptom management  Long term goal 2: Pt demo improved overall function by reporting greater than 80% per functional survey score  Long term goal 3: Improve B LT and MT strength 4/5 to allow patient to improve posture awareness in sitting and during activity. Progress toward goals: ongoing, working toward LTG 3     POST-PAIN       Pain Rating (0-10 pain scale):   \"little better\"/10   Location and pain description same as pre-treatment unless indicated. Action: [] NA   [x] Perform HEP  [x] Meds as prescribed  [] Modalities as prescribed   [] Call Physician     Frequency/Duration:  Plan  Times per week: 2  Plan weeks: 4  Current Treatment Recommendations: Strengthening, Modalities, Home Exercise Program, Integrated Dry Needling, Manual Therapy - Soft Tissue Mobilization, Neuromuscular Re-education  Plan Comment: Pt reports may be difficult to attend appts due to work schedule. Pt to continue current HEP. See objective section for any therapeutic exercise changes, additions or modifications this date.     PT Individual Minutes  Time In: 1600  Time Out: 3028  Minutes: 45  Timed Code Treatment Minutes: 33 Minutes  Procedure Minutes: E-stim x 10'      Timed Activity Minutes Units   Ther Ex 33 2     Signature:  Electronically signed by Gwen Cervantes PTA on 11/2/21 at 5:57 PM EDT

## 2021-11-02 NOTE — PROGRESS NOTES
Mary Sebastian  (1975)    2021    TELEHEALTH EVALUATION -- Audio/Visual (During EJEWX-28 public health emergency)    Due to COVID 19 outbreak, patient's office visit was converted to a virtual visit. Patient was contacted and agreed to proceed with a virtual visit via audio-visual platform. Patient understands that this encounter is a billable visit and that insurance coverage and co-pays are up to their individual insurance plans. At the beginning of this telehealth encounter, I verified with the patient their name and date of birth. Verbal consent for telehealth encounter was obtained. Subjective:       Chief Complaint   Patient presents with    Shoulder Pain     both shoulders       Mary Sebastian is 48 y.o. female who complains today of: Allergies:  Bactrim [sulfamethoxazole-trimethoprim], Naproxen, Bupivacaine hcl, Lidocaine, and Penicillins    History:  History reviewed. No pertinent past medical history. Past Surgical History:   Procedure Laterality Date     SECTION      1993    HAND SURGERY      right hand     History reviewed. No pertinent family history.   Social History     Socioeconomic History    Marital status: Single     Spouse name: Not on file    Number of children: Not on file    Years of education: Not on file    Highest education level: Not on file   Occupational History    Not on file   Tobacco Use    Smoking status: Current Every Day Smoker     Packs/day: 1.00     Types: Cigarettes    Smokeless tobacco: Former User    Tobacco comment: 1 pack a day   Substance and Sexual Activity    Alcohol use: No    Drug use: Not on file    Sexual activity: Not on file   Other Topics Concern    Not on file   Social History Narrative    Not on file     Social Determinants of Health     Financial Resource Strain:     Difficulty of Paying Living Expenses:    Food Insecurity:     Worried About Running Out of Food in the Last Year:     [address] in the Last Year:    Transportation Needs:     Lack of Transportation (Medical):  Lack of Transportation (Non-Medical):    Physical Activity:     Days of Exercise per Week:     Minutes of Exercise per Session:    Stress:     Feeling of Stress :    Social Connections:     Frequency of Communication with Friends and Family:     Frequency of Social Gatherings with Friends and Family:     Attends Gnosticism Services:     Active Member of Clubs or Organizations:     Attends Club or Organization Meetings:     Marital Status:    Intimate Partner Violence:     Fear of Current or Ex-Partner:     Emotionally Abused:     Physically Abused:     Sexually Abused:        Current Outpatient Medications on File Prior to Visit   Medication Sig Dispense Refill    escitalopram (LEXAPRO) 10 MG tablet Take by mouth      buPROPion (WELLBUTRIN SR) 150 MG extended release tablet TAKE 1 TABLET BY MOUTH TWICE DAILY      gabapentin (NEURONTIN) 800 MG tablet Take 1 tablet by mouth 4 times daily for 90 days. 360 tablet 0    CHANTIX STARTING MONTH MARIAA 0.5 MG X 11 & 1 MG X 42 tablet       lidocaine (LMX) 4 % cream Apply a half dollar sized amount to intact skin topically up to twice daily as needed for pain 1 Tube 1     Current Facility-Administered Medications on File Prior to Visit   Medication Dose Route Frequency Provider Last Rate Last Admin    lidocaine PF 1 % injection 10 mL  10 mL Other Once Santo Martinez MD        sodium bicarbonate 8.4 % injection 0.5 mEq  0.5 mEq IntraVENous Once Rociot MD Juan             Pt's f/u done today with a telehealth visit for her pain d/t Covid 19 pandemic. PCP is Dr. Radha Crenshaw MD. Pt last saw Dr. Charlie Florian on 10/13/21 for B/L shoulder injections which offered about 50% pain relief. She has been doing PT for her arms. Says she is able to sleep better d/t less numbness.    Patient had a EMG of the upper extremity February 2021 which was normal. X-ray report of the cervical spine dated 6/7/2021 shows mild to moderate to space loss and posterior osteophyte formation at C5-6 and diffuse facet arthropathy. MRI of the neck also ordered at the last office visit. History of degenerative changes at C5-6 per x-ray report of the neck dated 10/7/2019. He was encouraged to have a neurology evaluation. XR report of neck shows degenerative changes worse at C5-6. Elbow pain continues. Says gets numbness in arms, but seems to have lessened with PT.         She takes gabapentin 800 mg up to 4-day and has a 90-day prescription that was just sent this last office visit. Also uses tizanidine 4 mg up to 4 a day PRN. Pt feels pain level 4/10. Pt feels that gardening, crocheting, weather change, cleaning makes the pain worse, and medication, PT to some degree, rest makes the pain better. Pt feels her medication helps   her function and improve her quality of life. Pt admits to UE radiating numbness and tingling. Denies recent falls, injuries or trauma. Pt denies new weakness. Pt reports PT has been doing twice a week. Review of Systems   Constitutional: Negative. Negative for fatigue. HENT: Negative. Negative for trouble swallowing. Eyes: Negative. Respiratory: Negative for cough and shortness of breath. Cardiovascular: Negative for chest pain. Gastrointestinal: Negative. Negative for constipation and diarrhea. Endocrine: Negative. Genitourinary: Negative. Musculoskeletal: Positive for arthralgias and neck pain. Negative for back pain. Skin: Negative. Neurological: Positive for numbness. Negative for dizziness, weakness and headaches. Hematological: Negative. Psychiatric/Behavioral: Negative. Reviewed Dr. Witt List notes and reports from 10/4/21:  XR B Shoulder 10/1/21: No fracture. Left shoulder with with osteophyte formation AC joint. Right shoulder with bulbous subacromial process.   MRI cervical spine 7/28/2021: Degenerative disease and facet arthropathy. No fracture. C1-2 normal.  C2-3 normal canal foramen. C3-4 normal canal foramen. C4-5 normal canal foramen. C5-6 mild canal stenosis, mild bilateral foraminal narrowing. C6-7 borderline canal stenosis, normal foramen. C7-T1 normal canal foramen. XR C-spine 6/17/2021: Degenerative disease and facet arthropathy,, no no fracture, C5-6 retrolisthesis. EMG B UE 2/22/2021: This study is normal. There is no current electrodiagnostic evidence for an active bilateral cervical motor radiculopathy, bilateral median mononeuropathy, or generalized large fiber sensorimotor peripheral polyneuropathy. US B Elbows 3/11/20: bilateral common extensor tendon tendinopathy. Normal ulnar nerve measurements. EMG B UE 1/17/20: This study is limited, but normal. There is no current electrodiagnostic evidence for a generalized large fiber sensorimotor peripheral polyneuropathy.  Although limited, there is no clear evidence for an active cervical motor radiculopathy. X-rays cervical spine 10/7/19: Degenerative disc disease C5-6.  No fracture  -Min relief with Meloxicam 7.5 mg BID, allergy with Naproxen. Objective:     Vitals: There were no vitals taken for this visit. PHYSICAL EXAMINATION:    [x] Alert  [x] Oriented to person/place/time  [x] No apparent distress      [x] Mood and affect normal  [x] Recent and remote memory intact  [x] Judgement and insight normal     [] Breathing appears normal  [] No rash, lesions or ulcers on visible skin    [] Cranial Nerves II-XII grossly intact    [] Motor grossly intact in visible upper extremities    [] Motor grossly intact in visible lower extremities    [] Normal gait and station   [] No digital cyanosis    [] OTHER:      Due to this being a TeleHealth encounter, evaluation of the following organ systems is limited: Vitals/Constitutional/EENT/Resp/CV/GI//MS/Neuro/Skin/Heme-Lymph-Imm. Assessment:      Diagnosis Orders   1. Chronic pain of both shoulders     2. and recommendations. [id number]}    Pursuant to the emergency declaration under the [facility] waiver authority and the [employer] Act, this Virtual  Visit was conducted, with patient's consent, to reduce the patient's risk of exposure to COVID-19 and provide continuity of care for an established patient. Services were provided through a video synchronous discussion virtually to substitute for in-person clinic visit.

## 2021-11-05 ENCOUNTER — HOSPITAL ENCOUNTER (OUTPATIENT)
Dept: PHYSICAL THERAPY | Age: 46
Setting detail: THERAPIES SERIES
Discharge: HOME | End: 2021-11-05
Payer: COMMERCIAL

## 2021-11-05 PROCEDURE — 97110 THERAPEUTIC EXERCISES: CPT

## 2021-11-05 PROCEDURE — 97140 MANUAL THERAPY 1/> REGIONS: CPT

## 2021-11-05 ASSESSMENT — PAIN DESCRIPTION - ORIENTATION: ORIENTATION: RIGHT;LEFT

## 2021-11-05 ASSESSMENT — PAIN DESCRIPTION - FREQUENCY: FREQUENCY: CONTINUOUS

## 2021-11-05 ASSESSMENT — PAIN DESCRIPTION - LOCATION: LOCATION: ARM;SHOULDER

## 2021-11-05 ASSESSMENT — PAIN SCALES - GENERAL: PAINLEVEL_OUTOF10: 7

## 2021-11-05 NOTE — PROGRESS NOTES
Functions, Activity limitations: Decreased functional mobility , Decreased ROM, Decreased strength, Decreased posture, Increased pain  Assessment: Began session with providing STM/trigger point releases to R>L UT. Several large trigger points found but able to release. Pt demo'd great improvement in R SHL ROM post STM. Challenged with tband ex's but able to complete. Declined E-stim today. Treatment Diagnosis: B shoulder/arm pain after activity limiting tolerance to tasks at times. Prognosis: Good    Goals:  Long term goals  Time Frame for Long term goals : 4 weeks  Long term goal 1: Indep HEP for symptom management  Long term goal 2: Pt demo improved overall function by reporting greater than 80% per functional survey score  Long term goal 3: Improve B LT and MT strength 4/5 to allow patient to improve posture awareness in sitting and during activity. Progress toward goals: ongoing, pt showing good improvement in cervical flex/ext ROM     POST-PAIN       Pain Rating (0-10 pain scale):   5/10   Location and pain description same as pre-treatment unless indicated. Action: [] NA   [x] Perform HEP  [x] Meds as prescribed  [] Modalities as prescribed   [] Call Physician     Frequency/Duration:  Plan  Times per week: 2  Plan weeks: 4  Current Treatment Recommendations: Strengthening, Modalities, Home Exercise Program, Integrated Dry Needling, Manual Therapy - Soft Tissue Mobilization, Neuromuscular Re-education  Plan Comment: Pt reports may be difficult to attend appts due to work schedule. Pt to continue current HEP. See objective section for any therapeutic exercise changes, additions or modifications this date.     PT Individual Minutes  Time In: 6872  Time Out: 6011  Minutes: 40  Timed Code Treatment Minutes: 40 Minutes     Timed Activity Minutes Units   Ther Ex 32 2   Manual  8 1       Signature:  Electronically signed by Jono Christy PTA on 11/5/21 at 11:26 AM EDT

## 2021-11-09 ENCOUNTER — HOSPITAL ENCOUNTER (OUTPATIENT)
Dept: PHYSICAL THERAPY | Age: 46
Setting detail: THERAPIES SERIES
Discharge: HOME | End: 2021-11-09
Payer: COMMERCIAL

## 2021-11-09 PROCEDURE — 97110 THERAPEUTIC EXERCISES: CPT

## 2021-11-09 PROCEDURE — 97140 MANUAL THERAPY 1/> REGIONS: CPT

## 2021-11-09 ASSESSMENT — PAIN DESCRIPTION - FREQUENCY: FREQUENCY: CONTINUOUS

## 2021-11-09 ASSESSMENT — PAIN DESCRIPTION - ORIENTATION: ORIENTATION: RIGHT;LEFT

## 2021-11-09 ASSESSMENT — PAIN SCALES - GENERAL: PAINLEVEL_OUTOF10: 8

## 2021-11-09 ASSESSMENT — PAIN DESCRIPTION - DIRECTION: RADIATING_TOWARDS: FINGERS

## 2021-11-09 ASSESSMENT — PAIN DESCRIPTION - LOCATION: LOCATION: ARM;SHOULDER

## 2021-11-09 NOTE — PROGRESS NOTES
Priscilla Lindsey  [address]  [place]:[phone]        Date: 2021  Patient: Tanya Robertson  : 1975  ACCT #: [de-identified]  Referring Practitioner: Dr Korey Escoto  Diagnosis: B Shoulder pain  Treatment Diagnosis: B shoulder/arm pain after activity limiting tolerance to tasks at times. Visit Information:  PT Visit Information  Onset Date: 19  PT Insurance Information: [facility]  Total # of Visits Approved: 60 ($40 copay)  Total # of Visits to Date: 8  No Show: 0  Canceled Appointment: 0  Progress Note Counter:     Subjective: Pt c/o incresaed pain and tightness in her neck/shoulders, worse on left today. Started getting the numbness/pins and needles in her arms and fingers again last night which hasn't happenfed since before the recent cortisone injections. Pt reports that she has been very stressed with work and notices her shoulders rising and tensing a lot more lately. Comments: RTD - none on file  HEP Compliance:  [x] Good [] Fair [] Poor [] Reports not doing due to:    Vital Signs  Patient Currently in Pain: Yes   Pain Screening  Patient Currently in Pain: Yes  Pain Assessment  Pain Assessment: 0-10  Pain Level: 8  Pain Location: Arm; Shoulder  Pain Orientation: Right; Left  Pain Radiating Towards: fingers  Pain Descriptors: Constant; Burning; Tightness; Pins and needles  Pain Frequency: Continuous    OBJECTIVE:   Exercises  Exercise 1: UBE 2' forward / 2' backward  Exercise 2: midrow/ lat pull GTB x 10 ea  Exercise 3: bow and arrow GTB x 10 ea - held  Exercise 4: IR with GTB x 10 ea  Exercise 5: B ER YTB x 10  Exercise 6: chest pulls with YTB 10 x 3\"  Exercise 7: serratus punch with YTB x 10 ea - held  Exercise 8: serratus reach up with YTB x 10 - held  Exercise 20: HEP: bow and arrow, B ER, scap retraction.  Updated HEP on 10/13/21: tband IR, serratus punch/reach, midrow/lat pull    Manual:   Manual therapy  Soft Tissue Joon Lyon, PTA on 11/9/21 at 4:03 PM EST  Electronically signed by Amari Braswell PT on 11/9/2021 at 4:50 PM

## 2021-11-12 ENCOUNTER — HOSPITAL ENCOUNTER (OUTPATIENT)
Dept: PHYSICAL THERAPY | Age: 46
Setting detail: THERAPIES SERIES
Discharge: HOME | End: 2021-11-12
Payer: COMMERCIAL

## 2021-11-12 NOTE — PROGRESS NOTES
Therapy                            Cancellation/No-show Note      Date:  2021  Patient Name:  Citlali Roldan  :  1975   MRN:  [id number]  Referring Practitioner: Dr Marjorie Ho  Diagnosis: B Shoulder pain    Visit Information:  PT Visit Information  Onset Date: 19  PT Insurance Information: [facility]  Total # of Visits Approved: 60 ($40 copay)  Total # of Visits to Date: 8  No Show: 0  Canceled Appointment: 1  Progress Note Counter:  (cx'd on 21-increased pain)    For today's appointment patient:  [x]  Cancelled  []  Rescheduled appointment  []  No-show   []  Called pt to remind of next appointment     Reason given by patient:  []  Patient ill  []  Conflicting appointment  []  No transportation    []  Conflict with work  []  No reason given  [x]  Other: increased pain, following up with pain management on 21      [x] Pt has future appointments scheduled, no follow up needed  [] Pt requests to be on hold.     Reason:   If > 2 weeks please discuss with therapist.  [] Therapist to call pt for follow up     Comments:       Signature: Electronically signed by Felipe Lantigua PTA on 21 at 9:06 AM EST

## 2021-11-16 ENCOUNTER — HOSPITAL ENCOUNTER (OUTPATIENT)
Dept: PHYSICAL THERAPY | Age: 46
Setting detail: THERAPIES SERIES
Discharge: HOME | End: 2021-11-16
Payer: COMMERCIAL

## 2021-11-17 ENCOUNTER — OFFICE VISIT (OUTPATIENT)
Dept: PAIN MANAGEMENT | Age: 46
End: 2021-11-17
Payer: COMMERCIAL

## 2021-11-17 VITALS
TEMPERATURE: 97.7 F | DIASTOLIC BLOOD PRESSURE: 88 MMHG | BODY MASS INDEX: 36.65 KG/M2 | SYSTOLIC BLOOD PRESSURE: 130 MMHG | HEIGHT: 65 IN | WEIGHT: 220 LBS

## 2021-11-17 DIAGNOSIS — M77.11 LATERAL EPICONDYLITIS OF BOTH ELBOWS: ICD-10-CM

## 2021-11-17 DIAGNOSIS — M47.812 CERVICAL SPONDYLOSIS WITHOUT MYELOPATHY: ICD-10-CM

## 2021-11-17 DIAGNOSIS — M77.12 LATERAL EPICONDYLITIS OF BOTH ELBOWS: ICD-10-CM

## 2021-11-17 PROCEDURE — 99213 OFFICE O/P EST LOW 20 MIN: CPT | Performed by: NURSE PRACTITIONER

## 2021-11-17 PROCEDURE — G8482 FLU IMMUNIZE ORDER/ADMIN: HCPCS | Performed by: NURSE PRACTITIONER

## 2021-11-17 PROCEDURE — G8417 CALC BMI ABV UP PARAM F/U: HCPCS | Performed by: NURSE PRACTITIONER

## 2021-11-17 PROCEDURE — 4004F PT TOBACCO SCREEN RCVD TLK: CPT | Performed by: NURSE PRACTITIONER

## 2021-11-17 PROCEDURE — G8427 DOCREV CUR MEDS BY ELIG CLIN: HCPCS | Performed by: NURSE PRACTITIONER

## 2021-11-17 RX ORDER — GABAPENTIN 800 MG/1
800 TABLET ORAL 4 TIMES DAILY
Qty: 360 TABLET | Refills: 0 | Status: SHIPPED | OUTPATIENT
Start: 2021-11-17 | End: 2022-02-16 | Stop reason: SDUPTHER

## 2021-11-17 RX ORDER — TIZANIDINE 4 MG/1
4 TABLET ORAL 4 TIMES DAILY PRN
Qty: 120 TABLET | Refills: 0 | Status: CANCELLED | OUTPATIENT
Start: 2021-11-17 | End: 2021-12-17

## 2021-11-17 ASSESSMENT — ENCOUNTER SYMPTOMS
TROUBLE SWALLOWING: 0
GASTROINTESTINAL NEGATIVE: 1
SHORTNESS OF BREATH: 0
DIARRHEA: 0
EYES NEGATIVE: 1
CONSTIPATION: 0
COUGH: 0

## 2021-11-17 NOTE — PROGRESS NOTES
Con Green  (1975)    2021    Subjective:     Con Green is 48 y.o. female who complains today of:    Chief Complaint   Patient presents with    Follow-up     Neck & Shoulder Pain         Allergies:  Bactrim [sulfamethoxazole-trimethoprim], Naproxen, Bupivacaine hcl, Lidocaine, and Penicillins    History reviewed. No pertinent past medical history. Past Surgical History:   Procedure Laterality Date     SECTION          HAND SURGERY      right hand     History reviewed. No pertinent family history. Social History     Socioeconomic History    Marital status: Single     Spouse name: Not on file    Number of children: Not on file    Years of education: Not on file    Highest education level: Not on file   Occupational History    Not on file   Tobacco Use    Smoking status: Current Every Day Smoker     Packs/day: 1.00     Types: Cigarettes    Smokeless tobacco: Former User    Tobacco comment: 1 pack a day   Substance and Sexual Activity    Alcohol use: No    Drug use: Not on file    Sexual activity: Not on file   Other Topics Concern    Not on file   Social History Narrative    Not on file     Social Determinants of Health     Financial Resource Strain:     Difficulty of Paying Living Expenses: Not on file   Food Insecurity:     Worried About [address] in the Last Year: Not on file    [address] in the Last Year: Not on file   Transportation Needs:     Lack of Transportation (Medical): Not on file    Lack of Transportation (Non-Medical):  Not on file   Physical Activity:     Days of Exercise per Week: Not on file    Minutes of Exercise per Session: Not on file   Stress:     Feeling of Stress : Not on file   Social Connections:     Frequency of Communication with Friends and Family: Not on file    Frequency of Social Gatherings with Friends and Family: Not on file    Attends Rastafarian Services: Not on file   [employer] or Organizations: Not on file    Attends Club or Organization Meetings: Not on file    Marital Status: Not on file   Intimate Partner Violence:     Fear of Current or Ex-Partner: Not on file    Emotionally Abused: Not on file    Physically Abused: Not on file    Sexually Abused: Not on file   Housing Stability:     Unable to Pay for Housing in the Last Year: Not on file    Number of [place] in the Last Year: Not on file    Unstable Housing in the Last Year: Not on file       Current Outpatient Medications on File Prior to Visit   Medication Sig Dispense Refill    buPROPion (WELLBUTRIN SR) 150 MG extended release tablet TAKE 1 TABLET BY MOUTH TWICE DAILY      escitalopram (LEXAPRO) 10 MG tablet Take by mouth      tiZANidine (ZANAFLEX) 4 MG tablet Take 1 tablet by mouth 4 times daily as needed (pain spasms) As needed for pain and spasms 120 tablet 0    CHANTIX STARTING MONTH MARIAA 0.5 MG X 11 & 1 MG X 42 tablet       lidocaine (LMX) 4 % cream Apply a half dollar sized amount to intact skin topically up to twice daily as needed for pain 1 Tube 1     Current Facility-Administered Medications on File Prior to Visit   Medication Dose Route Frequency Provider Last Rate Last Admin    lidocaine PF 1 % injection 10 mL  10 mL Other Once Sandra Alexander MD        sodium bicarbonate 8.4 % injection 0.5 mEq  0.5 mEq IntraVENous Once Sandra Alexander MD               Pt presents today for a f/u of her pain. PCP is Dr. Stephen Alvarez MD.  Pt last saw this NP. Advised to be seen in office to determine if SNRB needed. on 10/13/21 for B/L shoulder injections which offered about 50% pain relief. She has yet to see Dr. Trang Pinon to review MRI. She has been doing PT for her arms but says her Sx seem better since not going to PT for about a week. She is now having more pain in both shoulders and achy pain in elbows. Says numbness is \"good\". Says she is initially was able to sleep better d/t less numbness.    Patient had a EMG of the upper extremity February 2021 which was normal. X-ray report of the cervical spine dated 6/7/2021 shows mild to moderate to space loss and posterior osteophyte formation at C5-6 and diffuse facet arthropathy. History of degenerative changes at C5-6 per x-ray report of the neck dated 10/7/2019. He was encouraged to have a neurology evaluation. XR report of neck shows degenerative changes worse at C5-6. Elbow pain continues. Says gets numbness in arms, but seems to have lessened with PT. MRI report shows mild stenosis C5-6 with multi-level degenerative changes.      She takes gabapentin 800 mg up to 4-day and needs 90-day. Also uses tizanidine 4 mg up to 4 a day PRN. Pt feels pain level 6/10. Pt feels that lifting arms, turning neck, PT makes the pain worse, and gabapentin, rest makes the pain better. Pt feels her medication helps   her function and improve her quality of life. Pt denies radiating numbness and tingling. Denies recent falls, injuries or trauma. Pt denies new weakness. Pt reports PT has been tried. Review of Systems   Constitutional: Negative. Negative for fatigue. HENT: Negative. Negative for trouble swallowing. Eyes: Negative. Respiratory: Negative for cough and shortness of breath. Cardiovascular: Negative for chest pain. Gastrointestinal: Negative. Negative for constipation and diarrhea. Endocrine: Negative. Genitourinary: Negative. Musculoskeletal: Positive for arthralgias and neck pain. Skin: Negative. Neurological: Negative for dizziness, weakness and headaches. Hematological: Negative. Psychiatric/Behavioral: Negative. Objective:     Vitals:  /88 (Site: Right Upper Arm)   Temp 97.7 °F (36.5 °C) (Infrared)   Ht 5' 5\" (1.651 m)   Wt 220 lb (99.8 kg)   BMI 36.61 kg/m² Pain Score:   6      Physical Exam  Vitals and nursing note reviewed.        This is an obese pleasant female who answers questions appropriately and follows commands. Smells of cigarette smoke. Pt is alert and oriented x 3. Recent and remote memory is intact. Mood and affect, judgement and insight are normal.  No signs of distress, no dyspnea or SOB noted. HEENT: PERRL. Neck is supple, trachea midline. No lymphadenopathy noted. Decreased ROM with flexion, extension and rotation of cervical spine. Tightness in trapezius with palpation noted. Non-tender with palpation over cervical spine. Negative Spurling's maneuver. Mildly positive Kirsty's sign. Strong grasp B/L. Strength is functional in UE bilaterally. Pulses are intact. Decreased ROM of Rt shoulder with discomfort. Strong grasp bilaterally. Cranial nerves II-XII are intact. Assessment:      Diagnosis Orders   1. Cervical spondylosis without myelopathy  gabapentin (NEURONTIN) 800 MG tablet   2. Lateral epicondylitis of both elbows         Plan:     Periodic Controlled Substance Monitoring: No signs of potential drug abuse or diversion identified. (Martina Dejesus, APRN - CNP)    Orders Placed This Encounter   Medications    gabapentin (NEURONTIN) 800 MG tablet     Sig: Take 1 tablet by mouth 4 times daily for 90 days. Dispense:  360 tablet     Refill:  0       No orders of the defined types were placed in this encounter. Discussed options with the patient today. Anatomic model pathology was shown and reviewed with pt. She says she has referral to Dr. Leann Nance and will schedule this. Hold PT at this time d/t increased pain afterwards. Reviewed MRI report of neck with pt and XR reports of shoulder. Shoulder injections continue to offer some relief. Discussed possible B/L C6 SNRB but pt is doing too well on exam.  Will continue gabapentin 800 Q6hrs and zanaflex 4mg - has enough zanaflex and 90 day gabapentin sent. All questions were answered. Discussed home exercise program and  smoking cessation. Relevant imaging and pain generators reviewed.  Pt verbalized understanding and agrees with above plan. Pt has chronic pain. Will continue medications for chronic pain that has been previously directed as they do help pt function with ADL and improve quality of life. OARRS was reviewed. This NP saw pt under direct supervision of Dr. Phyllis Humphries. Follow up:  Return in about 3 months (around 2/17/2022) for review meds and reassess pain, F/U Dr. Phyllis Humphries.     Jonathan Treadwell, APRN - CNP

## 2021-11-17 NOTE — PROGRESS NOTES
Therapy                            Cancellation/No-show Note      Date:  2021  Patient Name:  Felipe Arechiga  :  1975   MRN:  [id number]  Referring Practitioner: Dr Elva Steve  Diagnosis: B Shoulder pain    Visit Information:  PT Visit Information  Onset Date: 19  PT Insurance Information: [facility]  Total # of Visits Approved: 60 ($40 copay)  Total # of Visits to Date: 8  No Show: 0  Canceled Appointment: 1  Progress Note Counter:  (cx'd remaining appts)    For today's appointment patient:  [x]  Cancelled  []  Rescheduled appointment  []  No-show   []  Called pt to remind of next appointment     Reason given by patient:  []  Patient ill  []  Conflicting appointment  []  No transportation    []  Conflict with work  []  No reason given  [x]  Other:  Pt wishing to be placed on hold until follow up with orthopedic doctor. Will follow up in 2 weeks to check status. Pt to call dept to request DC if it will be greater than 30 days. [] Pt has future appointments scheduled, no follow up needed  [] Pt requests to be on hold.     Reason:   If > 2 weeks please discuss with therapist.  [] Therapist to call pt for follow up     Comments:       Signature: Electronically signed by Joelle Agrawal PT on 21 at 2:24 PM EST

## 2021-11-18 ENCOUNTER — APPOINTMENT (OUTPATIENT)
Dept: PHYSICAL THERAPY | Age: 46
End: 2021-11-18
Payer: COMMERCIAL

## 2021-11-23 ENCOUNTER — APPOINTMENT (OUTPATIENT)
Dept: PHYSICAL THERAPY | Age: 46
End: 2021-11-23
Payer: COMMERCIAL

## 2022-01-04 DIAGNOSIS — M47.812 CERVICAL SPONDYLOSIS WITHOUT MYELOPATHY: ICD-10-CM

## 2022-01-04 DIAGNOSIS — M77.12 LATERAL EPICONDYLITIS OF BOTH ELBOWS: ICD-10-CM

## 2022-01-04 DIAGNOSIS — M77.11 LATERAL EPICONDYLITIS OF BOTH ELBOWS: ICD-10-CM

## 2022-01-04 NOTE — TELEPHONE ENCOUNTER
Requested Prescriptions     Pending Prescriptions Disp Refills    tiZANidine (ZANAFLEX) 4 MG tablet 120 tablet 0     Sig: Take 1 tablet by mouth 4 times daily as needed (pain spasms) As needed for pain and spasms       Patient last seen on:  11/17  Date of last surgery:  n/a  Date of last refill:  11/2  Pain level:  n/a  Patient complaining of:  Pt asking for refill.    Future appts: 2/16/22

## 2022-01-05 RX ORDER — TIZANIDINE 4 MG/1
4 TABLET ORAL EVERY 8 HOURS PRN
Qty: 90 TABLET | Refills: 0 | Status: SHIPPED | OUTPATIENT
Start: 2022-01-05 | End: 2022-02-09 | Stop reason: SDUPTHER

## 2022-01-10 NOTE — PROGRESS NOTES
[address]  [place] [address], [address] [place] [address]:[phone]     []? Certification  []? Recertification     []? Plan of Care  []? Progress Note [x]? Discharge                            To:  Dr Randy Mendes                From:  Iker Vera, PT  Patient: Cheli Dumont     : 1975  Diagnosis: B Shoulder pain     Date: 1/10/2022  Treatment Diagnosis: B shoulder/arm pain after activity limiting tolerance to tasks at times.   Progress Report Period from:  10/8/2021  to 2021  Total # of Visits to Date: 8   No Show: 0    Canceled Appointment: 1      OBJECTIVE:   Short Term goals=Long Term Goals - Time Frame for Long term goals : 4 weeks  Goals Current/ Discharge status Met   Long term goal 1: Indep HEP for symptom management Written HEP provided for symptom management and is independent      [x]? yes  []? no   Long term goal 2: Pt demo improved overall function by reporting greater than 80% per functional survey score Not tested due to unexpected discharge.   []? yes  [x]? no   Long term goal 3: Improve B LT and MT strength 4/5 to allow patient to improve posture awareness in sitting and during activity. Not tested due to unexpected discharge.   []? yes  [x]? no         Body structures, Functions, Activity limitations: Decreased functional mobility , Decreased ROM, Decreased strength, Decreased posture, Increased pain  Assessment: Pt did not return to PT after 21 unable to determine functional outcomes d/t unexpected D/C. Pt wished to be placed on hold until she followed up with the orthopedic doctor. Left messages with pt for f/u but no response. Prognosis: Good      PT Education: Goals;PT Role;Plan of Care;Home Exercise Program  Patient Education: written HEP provided     PLAN: [x]? Discharge                                                      Patient Status:[]? Continue/ Initiate plan of Care                          [x]? Discharge PT. Recommend pt continue with HEP.                            []? Additional visits requested, Please re-certify for additional visits:                                                    Objective info by: Electronically signed by Rj Denton PTA on 1/10/22 at 1:32 PM EST  Electronically signed by Fabian Hinojosa PT on 1/10/2022 at 4:15 PM

## 2022-02-09 DIAGNOSIS — M77.12 LATERAL EPICONDYLITIS OF BOTH ELBOWS: ICD-10-CM

## 2022-02-09 DIAGNOSIS — M47.812 CERVICAL SPONDYLOSIS WITHOUT MYELOPATHY: ICD-10-CM

## 2022-02-09 DIAGNOSIS — M77.11 LATERAL EPICONDYLITIS OF BOTH ELBOWS: ICD-10-CM

## 2022-02-09 RX ORDER — TIZANIDINE 4 MG/1
4 TABLET ORAL EVERY 8 HOURS PRN
Qty: 21 TABLET | Refills: 0 | Status: SHIPPED | OUTPATIENT
Start: 2022-02-09 | End: 2022-02-16 | Stop reason: SDUPTHER

## 2022-02-09 NOTE — TELEPHONE ENCOUNTER
Requested Prescriptions     Pending Prescriptions Disp Refills    tiZANidine (ZANAFLEX) 4 MG tablet 90 tablet 0     Sig: Take 1 tablet by mouth every 8 hours as needed (pain spasms) As needed for pain and spasms       Patient last seen on:  11/17/21  Date of last surgery:  n/a  Date of last refill:  1/5/22  Pain level:  n/a  Patient complaining of:  Pt requesting refill  Future appts: 2/16/22

## 2022-02-11 ENCOUNTER — TELEPHONE (OUTPATIENT)
Dept: PAIN MANAGEMENT | Age: 47
End: 2022-02-11

## 2022-02-11 NOTE — TELEPHONE ENCOUNTER
I continued the most recently prescribed dose which was from January 2022 and was the TID dose. Looking back, it seems the dose was decreased in January due to there being a gap in the prescription. It was sent in November for QID then not requested by patient in December, no RX was sent at all. So in January it was decreased. Can discuss further at f/u. Thanks.

## 2022-02-16 ENCOUNTER — OFFICE VISIT (OUTPATIENT)
Dept: PAIN MANAGEMENT | Age: 47
End: 2022-02-16
Payer: COMMERCIAL

## 2022-02-16 VITALS
TEMPERATURE: 96.5 F | HEIGHT: 65 IN | BODY MASS INDEX: 36.65 KG/M2 | DIASTOLIC BLOOD PRESSURE: 90 MMHG | WEIGHT: 220 LBS | SYSTOLIC BLOOD PRESSURE: 142 MMHG

## 2022-02-16 DIAGNOSIS — M77.11 LATERAL EPICONDYLITIS OF BOTH ELBOWS: ICD-10-CM

## 2022-02-16 DIAGNOSIS — M48.02 CERVICAL STENOSIS OF SPINE: ICD-10-CM

## 2022-02-16 DIAGNOSIS — M77.12 LATERAL EPICONDYLITIS OF BOTH ELBOWS: ICD-10-CM

## 2022-02-16 DIAGNOSIS — M47.812 CERVICAL SPONDYLOSIS WITHOUT MYELOPATHY: ICD-10-CM

## 2022-02-16 DIAGNOSIS — M54.12 CERVICAL RADICULOPATHY: Primary | ICD-10-CM

## 2022-02-16 PROCEDURE — G8427 DOCREV CUR MEDS BY ELIG CLIN: HCPCS | Performed by: NURSE PRACTITIONER

## 2022-02-16 PROCEDURE — G8482 FLU IMMUNIZE ORDER/ADMIN: HCPCS | Performed by: NURSE PRACTITIONER

## 2022-02-16 PROCEDURE — G8417 CALC BMI ABV UP PARAM F/U: HCPCS | Performed by: NURSE PRACTITIONER

## 2022-02-16 PROCEDURE — 4004F PT TOBACCO SCREEN RCVD TLK: CPT | Performed by: NURSE PRACTITIONER

## 2022-02-16 PROCEDURE — 99213 OFFICE O/P EST LOW 20 MIN: CPT | Performed by: NURSE PRACTITIONER

## 2022-02-16 RX ORDER — GABAPENTIN 800 MG/1
800 TABLET ORAL 4 TIMES DAILY
Qty: 360 TABLET | Refills: 0 | Status: SHIPPED | OUTPATIENT
Start: 2022-02-16 | End: 2022-05-16 | Stop reason: SDUPTHER

## 2022-02-16 RX ORDER — LIDOCAINE 50 MG/G
1 PATCH TOPICAL DAILY
Qty: 30 PATCH | Refills: 0 | Status: CANCELLED | OUTPATIENT
Start: 2022-02-16 | End: 2022-03-18

## 2022-02-16 RX ORDER — TIZANIDINE 4 MG/1
4 TABLET ORAL EVERY 6 HOURS PRN
Qty: 360 TABLET | Refills: 0 | Status: SHIPPED | OUTPATIENT
Start: 2022-02-16 | End: 2022-05-16 | Stop reason: SDUPTHER

## 2022-02-16 ASSESSMENT — ENCOUNTER SYMPTOMS
SHORTNESS OF BREATH: 0
CONSTIPATION: 0
GASTROINTESTINAL NEGATIVE: 1
TROUBLE SWALLOWING: 0
COUGH: 0
DIARRHEA: 0
BACK PAIN: 0
EYES NEGATIVE: 1

## 2022-02-16 NOTE — PROGRESS NOTES
Toni Juarez  (1975)    2022    Subjective:     Toni Juarez is 48 y.o. female who complains today of:    Chief Complaint   Patient presents with    Neck Pain    Shoulder Pain    Arm Pain         Allergies:  Bactrim [sulfamethoxazole-trimethoprim], Naproxen, Bupivacaine hcl, Lidocaine, and Penicillins    History reviewed. No pertinent past medical history. Past Surgical History:   Procedure Laterality Date     SECTION      1993    HAND SURGERY      right hand     History reviewed. No pertinent family history. Social History     Socioeconomic History    Marital status: Single     Spouse name: Not on file    Number of children: Not on file    Years of education: Not on file    Highest education level: Not on file   Occupational History    Not on file   Tobacco Use    Smoking status: Current Every Day Smoker     Packs/day: 1.00     Types: Cigarettes    Smokeless tobacco: Former User    Tobacco comment: 1 pack a day   Substance and Sexual Activity    Alcohol use: No    Drug use: Not on file    Sexual activity: Not on file   Other Topics Concern    Not on file   Social History Narrative    Not on file     Social Determinants of Health     Financial Resource Strain:     Difficulty of Paying Living Expenses: Not on file   Food Insecurity:     Worried About [address] in the Last Year: Not on file    [address] in the Last Year: Not on file   Transportation Needs:     Lack of Transportation (Medical): Not on file    Lack of Transportation (Non-Medical):  Not on file   Physical Activity:     Days of Exercise per Week: Not on file    Minutes of Exercise per Session: Not on file   Stress:     Feeling of Stress : Not on file   Social Connections:     Frequency of Communication with Friends and Family: Not on file    Frequency of Social Gatherings with Friends and Family: Not on file    Attends [employer] Services: Not on file   [employer] Clubs or Organizations: Not on file    Attends Club or Organization Meetings: Not on file    Marital Status: Not on file   Intimate Partner Violence:     Fear of Current or Ex-Partner: Not on file    Emotionally Abused: Not on file    Physically Abused: Not on file    Sexually Abused: Not on file   Housing Stability:     Unable to Pay for Housing in the Last Year: Not on file    Number of [place] in the Last Year: Not on file    Unstable Housing in the Last Year: Not on file       Current Outpatient Medications on File Prior to Visit   Medication Sig Dispense Refill    buPROPion (WELLBUTRIN SR) 150 MG extended release tablet TAKE 1 TABLET BY MOUTH TWICE DAILY      escitalopram (LEXAPRO) 10 MG tablet Take by mouth      CHANTIX STARTING MONTH MARIAA 0.5 MG X 11 & 1 MG X 42 tablet       lidocaine (LMX) 4 % cream Apply a half dollar sized amount to intact skin topically up to twice daily as needed for pain 1 Tube 1     Current Facility-Administered Medications on File Prior to Visit   Medication Dose Route Frequency Provider Last Rate Last Admin    lidocaine PF 1 % injection 10 mL  10 mL Other Once Gisele Olvera MD        sodium bicarbonate 8.4 % injection 0.5 mEq  0.5 mEq IntraVENous Once Gisele Olvera MD               Pt presents today for a f/u of her pain. PCP is Dr. Paul Pitt MD.  Pt last saw this NP in November 2021. Discussed possible B/L C6 SNRB in the future. Advised to see Dr. Jairo Nicole to review MRI. She did see Dr. Jairo Nicole and wasn't happy with consult. She was told she needed surgery. Says she gets tightness in Rt shoulder, elbow and lower forearm. On 10/13/21 for B/L shoulder injections which offered about 50% pain relief. Patient had a EMG of the upper extremity February 2021 which was normal. X-ray report of the cervical spine dated 6/7/2021 shows mild to moderate to space loss and posterior osteophyte formation at C5-6 and diffuse facet arthropathy.     History of degenerative  C5-6 mild canal stenosis, mild bilateral foraminal narrowing.  C6-7 borderline canal stenosis, normal foramen.  C7-T1 normal canal foramen. XR C-spine 6/17/2021: Degenerative disease and facet arthropathy,, no no fracture, C5-6 retrolisthesis. EMG B UE 2/22/2021: This study is normal. There is no current electrodiagnostic evidence for an active bilateral cervical motor radiculopathy, bilateral median mononeuropathy, or generalized large fiber sensorimotor peripheral polyneuropathy. US B Elbows 3/11/20: bilateral common extensor tendon tendinopathy. Normal ulnar nerve measurements. EMG B UE 1/17/20: This study is limited, but normal. There is no current electrodiagnostic evidence for a generalized large fiber sensorimotor peripheral polyneuropathy.  Although limited, there is no clear evidence for an active cervical motor radiculopathy. X-rays cervical spine 10/7/19: Degenerative disc disease C5-6.  No fracture  -Min relief with Meloxicam 7.5 mg BID, allergy with Naproxen. Objective:     Vitals:  BP (!) 142/90   Temp 96.5 °F (35.8 °C)   Ht 5' 5\" (1.651 m)   Wt 220 lb (99.8 kg)   BMI 36.61 kg/m² Pain Score:   6      Physical Exam  Vitals and nursing note reviewed. This is an obese pleasant female who answers questions appropriately and follows commands. Tearful when discussing pain. Pt is alert and oriented x 3. Recent and remote memory is intact. Mood and affect, judgement and insight are normal.  No signs of distress, no dyspnea or SOB noted. HEENT: PERRL. Neck is supple, trachea midline. No lymphadenopathy noted. Decreased ROM with flexion, extension and rotation of cervical spine. Tightness in trapezius with palpation noted. Tender with palpation over cervical spine. Mildly positive Spurling's maneuver. Positive Kirsty's sign. Strong grasp B/L. Strength is functional in UE bilaterally. Pulses are intact. Decreased ROM of Rt shoulder with discomfort. Strong grasp bilaterally. Cranial nerves II-XII are intact.          Assessment:      Diagnosis Orders   1. Cervical radiculopathy  gabapentin (NEURONTIN) 800 MG tablet    Ambulatory referral to Neurosurgery   2. Cervical spondylosis without myelopathy  tiZANidine (ZANAFLEX) 4 MG tablet    gabapentin (NEURONTIN) 800 MG tablet    Ambulatory referral to Neurosurgery   3. Lateral epicondylitis of both elbows  tiZANidine (ZANAFLEX) 4 MG tablet   4. Cervical stenosis of spine  gabapentin (NEURONTIN) 800 MG tablet    Ambulatory referral to Neurosurgery       Plan:     Periodic Controlled Substance Monitoring: No signs of potential drug abuse or diversion identified. (Martina Dejesus, APRN - CNP)    Orders Placed This Encounter   Medications    tiZANidine (ZANAFLEX) 4 MG tablet     Sig: Take 1 tablet by mouth every 6 hours as needed (pain spasms) As needed for pain and spasms     Dispense:  360 tablet     Refill:  0    gabapentin (NEURONTIN) 800 MG tablet     Sig: Take 1 tablet by mouth 4 times daily for 90 days. Dispense:  360 tablet     Refill:  0       Orders Placed This Encounter   Procedures    Ambulatory referral to Neurosurgery     Referral Priority:   Routine     Referral Type:   Eval and Treat     Referral Reason:   Specialty Services Required     Referred to Provider:   Henny Smith MD     Number of Visits Requested:   1     Discussed options with the patient today. Anatomic model pathology was shown and reviewed with pt. Offered C6 SNR B to settle down some nerve pain as she has stenosis on her MRI report at C5-6 although mild. Discussed possible lidoderm patch, but pt has allergy so unable. She she is not wanting further injections. She would like to have a second opinion with Dr. Carmelita Lindsey to review her cervical MRI referred today. At this time we will continue her current dose of gabapentin 800 mg 4 a day as well as the tizanidine 4 mg 4 a day. She continues to work. She is under stress.   She was unhappy with her consult with Dr. Bouchra Dillon evidently. All questions were answered. Discussed home exercise program and  smoking cessation. Relevant imaging and pain generators reviewed. Pt verbalized understanding and agrees with above plan. Pt has chronic pain. Will continue medications for chronic pain that has been previously directed as they do help pt function with ADL and improve quality of life. OARRS was reviewed. This NP saw pt under direct supervision of Dr. Dali Honeycutt. Follow up:  Return in about 5 weeks (around 3/23/2022) for review meds and reassess pain.     Gerardo Treadwell, APRN - CNP

## 2022-03-24 ENCOUNTER — TELEPHONE (OUTPATIENT)
Dept: PAIN MANAGEMENT | Age: 47
End: 2022-03-24

## 2022-03-24 NOTE — TELEPHONE ENCOUNTER
Patient called asking if she could schedule neck injections? She says she would rather do that than have surgery.

## 2022-05-16 DIAGNOSIS — M48.02 CERVICAL STENOSIS OF SPINE: ICD-10-CM

## 2022-05-16 DIAGNOSIS — M54.12 CERVICAL RADICULOPATHY: ICD-10-CM

## 2022-05-16 DIAGNOSIS — M77.12 LATERAL EPICONDYLITIS OF BOTH ELBOWS: ICD-10-CM

## 2022-05-16 DIAGNOSIS — M47.812 CERVICAL SPONDYLOSIS WITHOUT MYELOPATHY: ICD-10-CM

## 2022-05-16 DIAGNOSIS — M77.11 LATERAL EPICONDYLITIS OF BOTH ELBOWS: ICD-10-CM

## 2022-05-16 NOTE — TELEPHONE ENCOUNTER
Requested Prescriptions     Pending Prescriptions Disp Refills    tiZANidine (ZANAFLEX) 4 MG tablet 360 tablet 0     Sig: Take 1 tablet by mouth every 6 hours as needed (pain spasms) As needed for pain and spasms    gabapentin (NEURONTIN) 800 MG tablet 360 tablet 0     Sig: Take 1 tablet by mouth 4 times daily for 90 days.        Patient last seen on:  2/16/22  Date of last surgery:  n/a  Date of last refill:  2/16/22  Pain level:  n/a  Patient complaining of:  Pt requesting refill/her son tested positive for covid/had to resched today's appt  Future appts: 6/20/22

## 2022-05-18 RX ORDER — GABAPENTIN 800 MG/1
800 TABLET ORAL 4 TIMES DAILY
Qty: 360 TABLET | Refills: 0 | Status: SHIPPED | OUTPATIENT
Start: 2022-05-18 | End: 2022-08-17 | Stop reason: SDUPTHER

## 2022-05-18 RX ORDER — TIZANIDINE 4 MG/1
4 TABLET ORAL EVERY 6 HOURS PRN
Qty: 360 TABLET | Refills: 0 | Status: SHIPPED | OUTPATIENT
Start: 2022-05-18 | End: 2022-08-17 | Stop reason: SDUPTHER

## 2022-06-20 ENCOUNTER — OFFICE VISIT (OUTPATIENT)
Dept: PAIN MANAGEMENT | Age: 47
End: 2022-06-20
Payer: COMMERCIAL

## 2022-06-20 VITALS — BODY MASS INDEX: 36.65 KG/M2 | WEIGHT: 220 LBS | HEIGHT: 65 IN | TEMPERATURE: 97 F

## 2022-06-20 DIAGNOSIS — M48.02 CERVICAL STENOSIS OF SPINE: ICD-10-CM

## 2022-06-20 DIAGNOSIS — M47.812 CERVICAL SPONDYLOSIS WITHOUT MYELOPATHY: Primary | ICD-10-CM

## 2022-06-20 PROCEDURE — 99213 OFFICE O/P EST LOW 20 MIN: CPT | Performed by: NURSE PRACTITIONER

## 2022-06-20 PROCEDURE — G8417 CALC BMI ABV UP PARAM F/U: HCPCS | Performed by: NURSE PRACTITIONER

## 2022-06-20 PROCEDURE — G8427 DOCREV CUR MEDS BY ELIG CLIN: HCPCS | Performed by: NURSE PRACTITIONER

## 2022-06-20 PROCEDURE — 4004F PT TOBACCO SCREEN RCVD TLK: CPT | Performed by: NURSE PRACTITIONER

## 2022-06-20 ASSESSMENT — ENCOUNTER SYMPTOMS
TROUBLE SWALLOWING: 0
COUGH: 0
SHORTNESS OF BREATH: 0
CONSTIPATION: 0
BACK PAIN: 1
GASTROINTESTINAL NEGATIVE: 1
DIARRHEA: 0
EYES NEGATIVE: 1

## 2022-06-20 NOTE — PROGRESS NOTES
Zack Ulloa  (1975)    2022    Subjective:     Zack Ulloa is 48 y.o. female who complains today of:    Chief Complaint   Patient presents with    Neck Pain         Allergies:  Bactrim [sulfamethoxazole-trimethoprim], Naproxen, Bupivacaine hcl, Lidocaine, and Penicillins    History reviewed. No pertinent past medical history. Past Surgical History:   Procedure Laterality Date     SECTION      1993    HAND SURGERY      right hand     History reviewed. No pertinent family history. Social History     Socioeconomic History    Marital status: Single     Spouse name: Not on file    Number of children: Not on file    Years of education: Not on file    Highest education level: Not on file   Occupational History    Not on file   Tobacco Use    Smoking status: Current Every Day Smoker     Packs/day: 1.00     Years: 20.00     Pack years: 20.00     Types: Cigarettes    Smokeless tobacco: Former User    Tobacco comment: 1 pack a day   Substance and Sexual Activity    Alcohol use: No    Drug use: Not on file    Sexual activity: Not on file   Other Topics Concern    Not on file   Social History Narrative    Not on file     Social Determinants of Health     Financial Resource Strain:     Difficulty of Paying Living Expenses: Not on file   Food Insecurity:     Worried About [address] in the Last Year: Not on file    [address] in the Last Year: Not on file   Transportation Needs:     Lack of Transportation (Medical): Not on file    Lack of Transportation (Non-Medical):  Not on file   Physical Activity:     Days of Exercise per Week: Not on file    Minutes of Exercise per Session: Not on file   Stress:     Feeling of Stress : Not on file   Social Connections:     Frequency of Communication with Friends and Family: Not on file    Frequency of Social Gatherings with Friends and Family: Not on file    Attends [employer] Services: Not on file   [place] [employer] Member of Clubs or Organizations: Not on file    Attends Club or Organization Meetings: Not on file    Marital Status: Not on file   Intimate Partner Violence:     Fear of Current or Ex-Partner: Not on file    Emotionally Abused: Not on file    Physically Abused: Not on file    Sexually Abused: Not on file   Housing Stability:     Unable to Pay for Housing in the Last Year: Not on file    Number of [place] in the Last Year: Not on file    Unstable Housing in the Last Year: Not on file       Current Outpatient Medications on File Prior to Visit   Medication Sig Dispense Refill    tiZANidine (ZANAFLEX) 4 MG tablet Take 1 tablet by mouth every 6 hours as needed (pain spasms) As needed for pain and spasms 360 tablet 0    gabapentin (NEURONTIN) 800 MG tablet Take 1 tablet by mouth 4 times daily for 90 days. 360 tablet 0    buPROPion (WELLBUTRIN SR) 150 MG extended release tablet TAKE 1 TABLET BY MOUTH TWICE DAILY      escitalopram (LEXAPRO) 10 MG tablet Take by mouth      CHANTIX STARTING MONTH MARIAA 0.5 MG X 11 & 1 MG X 42 tablet       lidocaine (LMX) 4 % cream Apply a half dollar sized amount to intact skin topically up to twice daily as needed for pain 1 Tube 1     Current Facility-Administered Medications on File Prior to Visit   Medication Dose Route Frequency Provider Last Rate Last Admin    lidocaine PF 1 % injection 10 mL  10 mL Other Once Willie Fairchild MD        sodium bicarbonate 8.4 % injection 0.5 mEq  0.5 mEq IntraVENous Once Willie Fairchild MD               Pt presents today for a f/u of her pain. PCP is Dr. Marla Saenz MD.  Pt last saw this NP in February 2022 and pt requested consult with Dr. Thomas Weathers at that time to review cervical MRI. She says her dad passed away recently and she also had to put her dog down so she stopped caring for herself. She is less active which seems to help with the pain, but did gain weight which affects her pain. She has been having depression and grieving. She sees PCP next week to discuss further as well. She she is not wanting further injections. She continues to work   She did see Dr. Noah Donovan and wasn't happy with consult. She was told she needed surgery. Says she gets tightness in Rt shoulder, elbow and lower forearm. On 10/13/21 for B/L shoulder injections which offered about 50% pain relief. Patient had a EMG of the upper extremity February 2021 which was normal. X-ray report of the cervical spine dated 6/7/2021 shows mild to moderate to space loss and posterior osteophyte formation at C5-6 and diffuse facet arthropathy. History of degenerative changes at C5-6 per x-ray report of the neck dated 10/7/2019. He was encouraged to have a neurology evaluation. XR report of neck shows degenerative changes worse at C5-6. Elbow pain continues. Says gets numbness in arms, but seems to have lessened with PT. MRI report shows mild stenosis C5-6 with multi-level degenerative changes.      She takes gabapentin 800 mg up to 4-day and uses tizanidine 4 mg Q6hrs PRN - she has her medication - refilled in May for 90 day supply. Pt feels pain level 4/10  Pt feels that stress, too much activity makes the pain worse, and inactivity, medication makes the pain better. Pt feels her medication helps   her function and improve her quality of life. Pt admits to UE radiating numbness and tingling. Denies recent falls, injuries or trauma. Pt denies new weakness. Pt reports PT has been done. Review of Systems   Constitutional: Negative. Negative for fatigue. HENT: Negative. Negative for trouble swallowing. Eyes: Negative. Respiratory: Negative for cough and shortness of breath. Cardiovascular: Negative for chest pain. Gastrointestinal: Negative. Negative for constipation and diarrhea. Endocrine: Negative. Genitourinary: Negative. Musculoskeletal: Positive for back pain and neck pain. Skin: Negative.          Breast skin changes Neurological: Negative for dizziness, weakness and headaches. Hematological: Negative. Psychiatric/Behavioral: Negative. Reviewed Dr. Jessica Templeton notes and reports from 10/4/21:  XR B Shoulder 10/1/21: No fracture. Left shoulder with with osteophyte formation AC joint. Right shoulder with bulbous subacromial process. MRI cervical spine 7/28/2021: Degenerative disease and facet arthropathy.  No fracture.  C1-2 normal.  C2-3 normal canal foramen.  C3-4 normal canal foramen.  C4-5 normal canal foramen.  C5-6 mild canal stenosis, mild bilateral foraminal narrowing.  C6-7 borderline canal stenosis, normal foramen.  C7-T1 normal canal foramen. XR C-spine 6/17/2021: Degenerative disease and facet arthropathy,, no no fracture, C5-6 retrolisthesis. EMG B UE 2/22/2021: This study is normal. There is no current electrodiagnostic evidence for an active bilateral cervical motor radiculopathy, bilateral median mononeuropathy, or generalized large fiber sensorimotor peripheral polyneuropathy. US B Elbows 3/11/20: bilateral common extensor tendon tendinopathy. Normal ulnar nerve measurements. EMG B UE 1/17/20: This study is limited, but normal. There is no current electrodiagnostic evidence for a generalized large fiber sensorimotor peripheral polyneuropathy.  Although limited, there is no clear evidence for an active cervical motor radiculopathy. X-rays cervical spine 10/7/19: Degenerative disc disease C5-6.  No fracture  -Min relief with Meloxicam 7.5 mg BID, allergy with Naproxen  Objective:     Vitals:  Temp 97 °F (36.1 °C)   Ht 5' 5\" (1.651 m)   Wt 220 lb (99.8 kg)   BMI 36.61 kg/m²        Physical Exam  Vitals and nursing note reviewed. This is an obese pleasant female who answers questions appropriately and follows commands.    Pt is alert and oriented x 3.  Recent and remote memory is intact.  Mood and affect, judgement and insight are normal.  No signs of distress, no dyspnea or SOB noted.  HEENT: [place] is supple, trachea midline.  No lymphadenopathy noted.  Decreased ROM with flexion, extension and rotation of cervical spine. Tightness in trapezius with palpation noted. Mildly tender with palpation over cervical spine. Mildly positive Spurling's maneuver.    Positive Kirsty's sign. Strong grasp B/L. Strength is functional in UE bilaterally.  Pulses are intact. Decreased ROM of Rt shoulder with discomfort.  Strong grasp bilaterally.   Cranial nerves II-XII are intact.           Assessment:      Diagnosis Orders   1. Cervical spondylosis without myelopathy     2. Cervical stenosis of spine         Plan:     Periodic Controlled Substance Monitoring: No signs of potential drug abuse or diversion identified. (Martina Dejesus, APRN - CNP)    No orders of the defined types were placed in this encounter. No orders of the defined types were placed in this encounter. Discussed options with the patient today. Anatomic model pathology was shown and reviewed with pt. She she is not wanting further injections. She will f/u with PCP. Condolences for loss of her dad and dog. At this time we will continue her current dose of gabapentin 800 mg 4 a day as well as the tizanidine 4 mg 4 a day - she has enough medications. She continues to work. All questions were answered. Discussed home exercise program.  Relevant imaging and pain generators reviewed. Pt verbalized understanding and agrees with above plan. Pt has chronic pain. Will continue medications for chronic pain that has been previously directed as they do help pt function with ADL and improve quality of life. OARRS was reviewed. This NP saw pt under direct supervision of Dr. Okeefe Officer. Follow up:  Return in about 2 months (around 8/20/2022) for review meds and reassess pain.     Amanda Dejesus, APRN - CNP

## 2022-08-17 ENCOUNTER — OFFICE VISIT (OUTPATIENT)
Dept: PAIN MANAGEMENT | Age: 47
End: 2022-08-17
Payer: COMMERCIAL

## 2022-08-17 VITALS — HEIGHT: 65 IN | TEMPERATURE: 96.8 F | WEIGHT: 200 LBS | BODY MASS INDEX: 33.32 KG/M2 | RESPIRATION RATE: 18 BRPM

## 2022-08-17 DIAGNOSIS — M47.812 CERVICAL SPONDYLOSIS WITHOUT MYELOPATHY: ICD-10-CM

## 2022-08-17 DIAGNOSIS — M77.12 LATERAL EPICONDYLITIS OF BOTH ELBOWS: ICD-10-CM

## 2022-08-17 DIAGNOSIS — M54.12 CERVICAL RADICULOPATHY: ICD-10-CM

## 2022-08-17 DIAGNOSIS — M48.02 CERVICAL STENOSIS OF SPINE: ICD-10-CM

## 2022-08-17 DIAGNOSIS — M77.11 LATERAL EPICONDYLITIS OF BOTH ELBOWS: ICD-10-CM

## 2022-08-17 PROCEDURE — 99213 OFFICE O/P EST LOW 20 MIN: CPT | Performed by: NURSE PRACTITIONER

## 2022-08-17 PROCEDURE — 4004F PT TOBACCO SCREEN RCVD TLK: CPT | Performed by: NURSE PRACTITIONER

## 2022-08-17 PROCEDURE — G8427 DOCREV CUR MEDS BY ELIG CLIN: HCPCS | Performed by: NURSE PRACTITIONER

## 2022-08-17 PROCEDURE — G8417 CALC BMI ABV UP PARAM F/U: HCPCS | Performed by: NURSE PRACTITIONER

## 2022-08-17 RX ORDER — TIZANIDINE 4 MG/1
4 TABLET ORAL EVERY 6 HOURS PRN
Qty: 360 TABLET | Refills: 0 | Status: SHIPPED | OUTPATIENT
Start: 2022-08-17 | End: 2022-11-15

## 2022-08-17 RX ORDER — GABAPENTIN 800 MG/1
800 TABLET ORAL 4 TIMES DAILY
Qty: 360 TABLET | Refills: 0 | Status: SHIPPED | OUTPATIENT
Start: 2022-08-17 | End: 2022-11-15

## 2022-08-17 ASSESSMENT — ENCOUNTER SYMPTOMS
EYES NEGATIVE: 1
TROUBLE SWALLOWING: 0
SHORTNESS OF BREATH: 0
CONSTIPATION: 0
DIARRHEA: 0
COUGH: 0
GASTROINTESTINAL NEGATIVE: 1

## 2022-08-17 NOTE — PROGRESS NOTES
Meg Hernandez  (1975)    2022    Subjective:     Meg Hernandez is 48 y.o. female who complains today of:    Chief Complaint   Patient presents with    Neck Pain         Allergies:  Bactrim [sulfamethoxazole-trimethoprim], Naproxen, Bupivacaine hcl, Lidocaine, and Penicillins    History reviewed. No pertinent past medical history. Past Surgical History:   Procedure Laterality Date     SECTION      1993    HAND SURGERY      right hand     History reviewed. No pertinent family history.   Social History     Socioeconomic History    Marital status: Single     Spouse name: Not on file    Number of children: Not on file    Years of education: Not on file    Highest education level: Not on file   Occupational History    Not on file   Tobacco Use    Smoking status: Every Day     Packs/day: 1.00     Years: 20.00     Pack years: 20.00     Types: Cigarettes    Smokeless tobacco: Former    Tobacco comments:     1 pack a day   Substance and Sexual Activity    Alcohol use: No    Drug use: Not on file    Sexual activity: Not on file   Other Topics Concern    Not on file   Social History Narrative    Not on file     Social Determinants of Health     Financial Resource Strain: Not on file   Food Insecurity: Not on file   Transportation Needs: Not on file   Physical Activity: Not on file   Stress: Not on file   Social Connections: Not on file   Intimate Partner Violence: Not on file   Housing Stability: Not on file       Current Outpatient Medications on File Prior to Visit   Medication Sig Dispense Refill    buPROPion (WELLBUTRIN SR) 150 MG extended release tablet TAKE 1 TABLET BY MOUTH TWICE DAILY      escitalopram (LEXAPRO) 10 MG tablet Take by mouth      CHANTIX STARTING MONTH MARIAA 0.5 MG X 11 & 1 MG X 42 tablet       lidocaine (LMX) 4 % cream Apply a half dollar sized amount to intact skin topically up to twice daily as needed for pain 1 Tube 1     Current Facility-Administered Medications on File Prior to Visit   Medication Dose Route Frequency Provider Last Rate Last Admin    lidocaine PF 1 % injection 10 mL  10 mL Other Once Salazar Caputo MD        sodium bicarbonate 8.4 % injection 0.5 mEq  0.5 mEq IntraVENous Once Salazar Caputo MD               Pt presents today for a f/u of her pain. PCP is Dr. Jonathan Braswell MD.  Pt last saw this NP in May 2022. She is still under stress. Her son moved out of state. She is less active which seems to help with the pain. She says weight gain \"horrible\" and says seeing PCP next month to address her weight gain. She says shoulder pain hasn't restarted again. Admits to continued numbness and tingling into arms to hands. Uses gabapentin for this. She she is not wanting further injections. She continues to work     On 10/13/21 for B/L shoulder injections which offered about 50% pain relief. Patient had a EMG of the upper extremity February 2021 which was normal. X-ray report of the cervical spine dated 6/7/2021 shows mild to moderate to space loss and posterior osteophyte formation at C5-6 and diffuse facet arthropathy. History of degenerative changes at C5-6 per x-ray report of the neck dated 10/7/2019. He was encouraged to have a neurology evaluation. XR report of neck shows degenerative changes worse at C5-6. Elbow pain continues. Says gets numbness in arms, but seems to have lessened with PT. MRI report shows mild stenosis C5-6 with multi-level degenerative changes. She takes gabapentin 800 mg up to 4-day and uses tizanidine 4 mg Q6hrs PRN. Pt feels pain level 4/10. Pt feels that using arm, activity makes the pain worse, and medication, rest makes the pain better. Pt feels her medication helps   her function and improve her quality of life. Pt admits to Lt UE radiating numbness and tingling. Denies recent falls, injuries or trauma. Pt denies new weakness. Pt reports PT has been done.          Review of Systems   Constitutional: Negative. Negative for fatigue. HENT: Negative. Negative for trouble swallowing. Eyes: Negative. Respiratory:  Negative for cough and shortness of breath. Cardiovascular:  Negative for chest pain. Gastrointestinal: Negative. Negative for constipation and diarrhea. Endocrine: Negative. Genitourinary: Negative. Musculoskeletal:  Positive for neck pain. Skin: Negative. Neurological:  Positive for numbness. Negative for dizziness, weakness and headaches. Hematological: Negative. Psychiatric/Behavioral: Negative. Reviewed Dr. Hayes Hawthorne notes and reports from 10/4/21:  XR B Shoulder 10/1/21: No fracture. Left shoulder with with osteophyte formation AC joint. Right shoulder with bulbous subacromial process. MRI cervical spine 7/28/2021: Degenerative disease and facet arthropathy. No fracture. C1-2 normal.  C2-3 normal canal foramen. C3-4 normal canal foramen. C4-5 normal canal foramen. C5-6 mild canal stenosis, mild bilateral foraminal narrowing. C6-7 borderline canal stenosis, normal foramen. C7-T1 normal canal foramen. XR C-spine 6/17/2021: Degenerative disease and facet arthropathy,, no no fracture, C5-6 retrolisthesis. EMG B UE 2/22/2021: This study is normal. There is no current electrodiagnostic evidence for an active bilateral cervical motor radiculopathy, bilateral median mononeuropathy, or generalized large fiber sensorimotor peripheral polyneuropathy. US B Elbows 3/11/20: bilateral common extensor tendon tendinopathy. Normal ulnar nerve measurements. EMG B UE 1/17/20: This study is limited, but normal. There is no current electrodiagnostic evidence for a generalized large fiber sensorimotor peripheral polyneuropathy. Although limited, there is no clear evidence for an active cervical motor radiculopathy. X-rays cervical spine 10/7/19: Degenerative disc disease C5-6.   No fracture  -Min relief with Meloxicam 7.5 mg BID, allergy with Naproxen    Objective: were placed in this encounter. Discussed options with the patient today. Anatomic model pathology was shown and reviewed with pt. She she is not wanting further injections. She will f/u with PCP. At this time we will continue her current dose of gabapentin 800 mg 4 a day as well as the tizanidine 4 mg 4 a day for 90 day supply. Weight loss discussed and encouraged. All questions were answered. Discussed home exercise program.  Relevant imaging and pain generators reviewed. Pt verbalized understanding and agrees with above plan. Pt has chronic pain. Will continue medications for chronic pain that has been previously directed as they do help pt function with ADL and improve quality of life. OARRS was reviewed. This NP saw pt under direct supervision of Dr. Filomena Barbosa. Follow up:  Return in about 3 months (around 11/17/2022) for review meds and reassess pain.     Akin Treadwell, MECHELLE - CNP

## 2022-12-08 DIAGNOSIS — M77.11 LATERAL EPICONDYLITIS OF BOTH ELBOWS: ICD-10-CM

## 2022-12-08 DIAGNOSIS — M77.12 LATERAL EPICONDYLITIS OF BOTH ELBOWS: ICD-10-CM

## 2022-12-08 DIAGNOSIS — M48.02 CERVICAL STENOSIS OF SPINE: ICD-10-CM

## 2022-12-08 DIAGNOSIS — M47.812 CERVICAL SPONDYLOSIS WITHOUT MYELOPATHY: ICD-10-CM

## 2022-12-08 DIAGNOSIS — M54.12 CERVICAL RADICULOPATHY: ICD-10-CM

## 2022-12-09 RX ORDER — TIZANIDINE 4 MG/1
TABLET ORAL
Qty: 360 TABLET | Refills: 0 | OUTPATIENT
Start: 2022-12-09

## 2022-12-09 RX ORDER — GABAPENTIN 800 MG/1
TABLET ORAL
Qty: 360 TABLET | Refills: 0 | OUTPATIENT
Start: 2022-12-09

## 2023-02-12 PROBLEM — R03.0 BLOOD PRESSURE ELEVATED WITHOUT HISTORY OF HTN: Status: ACTIVE | Noted: 2023-02-12

## 2023-02-12 PROBLEM — F41.8 DEPRESSION WITH ANXIETY: Status: ACTIVE | Noted: 2023-02-12

## 2023-02-12 PROBLEM — N60.12 CHRONIC MASTITIS OF LEFT BREAST: Status: ACTIVE | Noted: 2023-02-12

## 2023-02-12 PROBLEM — R53.83 FATIGUE: Status: ACTIVE | Noted: 2023-02-12

## 2023-02-12 PROBLEM — S05.01XA RIGHT CORNEAL ABRASION: Status: ACTIVE | Noted: 2023-02-12

## 2023-02-12 PROBLEM — G89.4 CHRONIC PAIN SYNDROME: Status: ACTIVE | Noted: 2023-02-12

## 2023-02-12 PROBLEM — I10 ESSENTIAL HYPERTENSION: Status: ACTIVE | Noted: 2023-02-12

## 2023-02-12 PROBLEM — F17.200 NICOTINE DEPENDENCE: Status: ACTIVE | Noted: 2023-02-12

## 2023-02-12 PROBLEM — R53.81 DEBILITY: Status: ACTIVE | Noted: 2023-02-12

## 2023-02-12 PROBLEM — F41.0 GENERALIZED ANXIETY DISORDER WITH PANIC ATTACKS: Status: ACTIVE | Noted: 2023-02-12

## 2023-02-12 PROBLEM — N61.1 NONPUERPERAL ABSCESS OF LEFT BREAST: Status: ACTIVE | Noted: 2023-02-12

## 2023-02-12 PROBLEM — R20.2 PARESTHESIAS: Status: ACTIVE | Noted: 2023-02-12

## 2023-02-12 PROBLEM — G24.5 BLEPHAROSPASM OF RIGHT EYE: Status: ACTIVE | Noted: 2023-02-12

## 2023-02-12 PROBLEM — G89.29 NECK PAIN, CHRONIC: Status: ACTIVE | Noted: 2023-02-12

## 2023-02-12 PROBLEM — M35.3 POLYMYALGIA (MULTI): Status: ACTIVE | Noted: 2023-02-12

## 2023-02-12 PROBLEM — E55.9 VITAMIN D DEFICIENCY: Status: ACTIVE | Noted: 2023-02-12

## 2023-02-12 PROBLEM — J20.9 ACUTE BRONCHITIS: Status: ACTIVE | Noted: 2023-02-12

## 2023-02-12 PROBLEM — N64.89 ACQUIRED BREAST DEFORMITY: Status: ACTIVE | Noted: 2023-02-12

## 2023-02-12 PROBLEM — R73.9 HYPERGLYCEMIA: Status: ACTIVE | Noted: 2023-02-12

## 2023-02-12 PROBLEM — N63.20 MASS OF LEFT BREAST: Status: ACTIVE | Noted: 2023-02-12

## 2023-02-12 PROBLEM — R09.81 SINUS CONGESTION: Status: ACTIVE | Noted: 2023-02-12

## 2023-02-12 PROBLEM — M54.12 CERVICAL RADICULOPATHY: Status: ACTIVE | Noted: 2023-02-12

## 2023-02-12 PROBLEM — E78.2 MIXED HYPERLIPIDEMIA: Status: ACTIVE | Noted: 2023-02-12

## 2023-02-12 PROBLEM — E78.1 HYPERTRIGLYCERIDEMIA: Status: ACTIVE | Noted: 2023-02-12

## 2023-02-12 PROBLEM — R74.01 TRANSAMINASEMIA: Status: ACTIVE | Noted: 2023-02-12

## 2023-02-12 PROBLEM — M46.1 SACROILIITIS (CMS-HCC): Status: ACTIVE | Noted: 2023-02-12

## 2023-02-12 PROBLEM — M50.90 CERVICAL DISC DISEASE: Status: ACTIVE | Noted: 2023-02-12

## 2023-02-12 PROBLEM — R91.1 NODULE OF RIGHT LUNG: Status: ACTIVE | Noted: 2023-02-12

## 2023-02-12 PROBLEM — R20.0 HAND NUMBNESS: Status: ACTIVE | Noted: 2023-02-12

## 2023-02-12 PROBLEM — E78.2 HYPERCHOLESTEROLEMIA WITH HYPERTRIGLYCERIDEMIA: Status: ACTIVE | Noted: 2023-02-12

## 2023-02-12 PROBLEM — D75.89 MACROCYTOSIS WITHOUT ANEMIA: Status: ACTIVE | Noted: 2023-02-12

## 2023-02-12 PROBLEM — F41.1 GENERALIZED ANXIETY DISORDER WITH PANIC ATTACKS: Status: ACTIVE | Noted: 2023-02-12

## 2023-02-12 PROBLEM — D75.1 POLYCYTHEMIA: Status: ACTIVE | Noted: 2023-02-12

## 2023-02-12 PROBLEM — F17.210 CIGARETTE SMOKER MOTIVATED TO QUIT: Status: ACTIVE | Noted: 2023-02-12

## 2023-02-12 PROBLEM — J98.01 BRONCHOSPASM: Status: ACTIVE | Noted: 2023-02-12

## 2023-02-12 PROBLEM — F51.02 ADJUSTMENT INSOMNIA: Status: ACTIVE | Noted: 2023-02-12

## 2023-02-12 PROBLEM — M54.2 NECK PAIN, CHRONIC: Status: ACTIVE | Noted: 2023-02-12

## 2023-02-12 PROBLEM — F17.200 TOBACCO USE DISORDER: Status: ACTIVE | Noted: 2023-02-12

## 2023-02-12 PROBLEM — R31.29 OTHER MICROSCOPIC HEMATURIA: Status: ACTIVE | Noted: 2023-02-12

## 2023-02-12 RX ORDER — ESCITALOPRAM OXALATE 10 MG/1
10 TABLET ORAL
COMMUNITY
Start: 2020-11-11

## 2023-02-12 RX ORDER — ACETAMINOPHEN, DIPHENHYDRAMINE HCL, PHENYLEPHRINE HCL 325; 25; 5 MG/1; MG/1; MG/1
TABLET ORAL
COMMUNITY

## 2023-02-12 RX ORDER — MINERAL OIL
1 ENEMA (ML) RECTAL DAILY
COMMUNITY
Start: 2020-04-28

## 2023-02-12 RX ORDER — TRAZODONE HYDROCHLORIDE 50 MG/1
50 TABLET ORAL NIGHTLY
COMMUNITY
End: 2023-03-21 | Stop reason: SDUPTHER

## 2023-02-12 RX ORDER — GABAPENTIN 800 MG/1
1 TABLET ORAL 3 TIMES DAILY
COMMUNITY
Start: 2020-11-19

## 2023-02-12 RX ORDER — BUPROPION HYDROCHLORIDE 150 MG/1
150 TABLET ORAL
COMMUNITY
Start: 2022-09-13

## 2023-02-12 RX ORDER — ALBUTEROL SULFATE 90 UG/1
2 AEROSOL, METERED RESPIRATORY (INHALATION)
COMMUNITY
Start: 2020-04-28

## 2023-02-12 RX ORDER — ERGOCALCIFEROL 1.25 MG/1
50000 CAPSULE ORAL
COMMUNITY
Start: 2019-11-05 | End: 2023-03-21 | Stop reason: SDUPTHER

## 2023-02-12 RX ORDER — NEOMYCIN SULFATE, POLYMYXIN B SULFATE AND DEXAMETHASONE 3.5; 10000; 1 MG/ML; [USP'U]/ML; MG/ML
1 SUSPENSION/ DROPS OPHTHALMIC 4 TIMES DAILY
COMMUNITY

## 2023-02-12 RX ORDER — CYCLOBENZAPRINE HCL 10 MG
10 TABLET ORAL 3 TIMES DAILY PRN
COMMUNITY
Start: 2021-01-15

## 2023-02-12 RX ORDER — IBUPROFEN 200 MG
1 TABLET ORAL EVERY 24 HOURS
COMMUNITY

## 2023-02-12 RX ORDER — ALPRAZOLAM 0.25 MG/1
0.25 TABLET ORAL EVERY 6 HOURS
COMMUNITY
Start: 2022-07-20

## 2023-02-12 RX ORDER — FLUTICASONE PROPIONATE 50 MCG
2 SPRAY, SUSPENSION (ML) NASAL DAILY
COMMUNITY
Start: 2020-04-28

## 2023-03-21 ENCOUNTER — OFFICE VISIT (OUTPATIENT)
Dept: PRIMARY CARE | Facility: CLINIC | Age: 48
End: 2023-03-21
Payer: COMMERCIAL

## 2023-03-21 VITALS
RESPIRATION RATE: 18 BRPM | HEIGHT: 65 IN | SYSTOLIC BLOOD PRESSURE: 125 MMHG | BODY MASS INDEX: 39.65 KG/M2 | WEIGHT: 238 LBS | DIASTOLIC BLOOD PRESSURE: 88 MMHG | HEART RATE: 98 BPM | OXYGEN SATURATION: 97 %

## 2023-03-21 DIAGNOSIS — F51.02 ADJUSTMENT INSOMNIA: ICD-10-CM

## 2023-03-21 DIAGNOSIS — F17.210 CIGARETTE SMOKER MOTIVATED TO QUIT: Primary | ICD-10-CM

## 2023-03-21 DIAGNOSIS — G89.4 CHRONIC PAIN SYNDROME: ICD-10-CM

## 2023-03-21 DIAGNOSIS — E55.9 VITAMIN D DEFICIENCY: ICD-10-CM

## 2023-03-21 PROCEDURE — 99213 OFFICE O/P EST LOW 20 MIN: CPT | Performed by: INTERNAL MEDICINE

## 2023-03-21 PROCEDURE — 3074F SYST BP LT 130 MM HG: CPT | Performed by: INTERNAL MEDICINE

## 2023-03-21 PROCEDURE — 3079F DIAST BP 80-89 MM HG: CPT | Performed by: INTERNAL MEDICINE

## 2023-03-21 PROCEDURE — 4004F PT TOBACCO SCREEN RCVD TLK: CPT | Performed by: INTERNAL MEDICINE

## 2023-03-21 RX ORDER — TIZANIDINE 4 MG/1
TABLET ORAL
COMMUNITY
Start: 2022-08-17

## 2023-03-21 RX ORDER — ERGOCALCIFEROL 1.25 MG/1
50000 CAPSULE ORAL
Qty: 13 CAPSULE | Refills: 2 | Status: SHIPPED | OUTPATIENT
Start: 2023-03-21 | End: 2023-12-16

## 2023-03-21 RX ORDER — TRAZODONE HYDROCHLORIDE 50 MG/1
50 TABLET ORAL NIGHTLY PRN
Qty: 90 TABLET | Refills: 1 | Status: SHIPPED | OUTPATIENT
Start: 2023-03-21 | End: 2023-09-17

## 2023-03-21 NOTE — PROGRESS NOTES
"Subjective   Patient ID: Althea Jiménez is a 47 y.o. female who presents for Follow-up.    HPI   Compliant to medications, tolerating regimens, and stating that she has not had better and better sleep each night, waking up refreshed.  Does admit that she had a little binge of alcohol, and as such, she had to postpone her FASTING laboratory examination.  Still, states that she is indeed motivated to get better.  No ritu substernal chest pain.  No orthopnea, no paroxysmal nocturnal dyspnea.  No headache, blurred vision, diplopia.  No dysphagia.  Continues to want to stay healthy and independent and productive.  Does not wish harm to self or others.    Pain along the angle of the mandible, right, but otherwise, no other accompanying symptoms.  Occasional coughing, no particular sputum production.  No change in hearing.  CONSTITUTIONALLY, no fever, no chills.  No night sweats.  No lingering anorexia or nausea.  No apparent lymphadenopathy.  No apparent weight loss.      Appetite preserved, with no nausea, vomiting, abdominal distress.  No diarrhea, no constipation.  No apparent blood in stool.  No apparent weight loss.  No dysuria, flank, suprapubic pain.  No discharge, no pruritus.  No rash.  No malodor.  No hesitancy, no feeling of incomplete voiding.        Review of Systems  Review of systems as in history of present illness, and otherwise, reviewed separately as well, and was unremarkable/negative/noncontributory.        Objective   /88 (BP Location: Right arm, Patient Position: Sitting, BP Cuff Size: Adult)   Pulse 98   Resp 18   Ht 1.651 m (5' 5\")   Wt 108 kg (238 lb)   SpO2 97%   BMI 39.61 kg/m²     Physical Exam  In very good spirits.  Not in distress or diaphoresis.  Alert, oriented x3.  Amiable.  Not unkempt.  Moderately obese build.  Continues to want to stay healthy and independent, and does not wish harm to self or others.    HEAD pink palpebral conjunctivae, anicteric sclerae.  Mucous " membranes moist.  No tonsillopharyngeal congestion.  No thrush.  No sinus or tragal tenderness, with no tenderness along the angle and periauricular area.  Tympanic membrane intact, right ear.  NECK supple, no apparent jugular venous distention.  No apparent lymphadenopathy.  CARDIOVASCULAR not in distress or diaphoresis.  No bipedal edema.  LUNGS not in distress or diaphoresis.  Not using accessory muscles.  Clear to auscultation bilaterally.  ABDOMEN soft, nontender.  BACK no costovertebral angle tenderness.  EXTREMITIES no clubbing, no cyanosis.  NEURO no facial asymmetry.  No apparent cranial nerve deficits.  Romberg negative.  Ambulating without need of assistance.  No apparent focal weakness.  No tremors.  PSYCH receptive, appropriate, and eager to maintain and improve quality of life.       Assessment/Plan        Thank you very much for coming.  I am very happy to see you.    The pain that you had at the angle of your jaw is typical for irritation of your right ear.  It could also be related to your throat, and could be related to smoking, as well as allergies.  It will all depend on what accompanying symptoms you might have.    The best first thing to do of course is to try to minimize smoking.  Try your best to cut back to as close as half a pack a day.  Remember to use your NICOTINE PATCH at bedtime, and keep it on as long as possible until you have your next cigarette on the next day.  Never smoked with the nicotine patch on, because it will just make you miserable.    Also, drink lots of fluids throughout the day, and do lots of warm salt water gargles.  The active drinking warm liquids and clearing your throat, these are all very good for your throat and your ears and your sinuses.    You are overdue for FASTING laboratory examinations.  Go ahead and have them done at any time, and once I see the results, I will send word and let you know if there are any changes that need to be done.    Please come  back in 2 months.  I would like to work on your efforts to quit smoking finally, and I also want to review your laboratory results, and also, we should discuss preventive strategies, including weight management.  Until then, please continue to take care of yourself.  Get lots of rest and sleep.  Drink lots of fluids throughout the day.  Eat more sensibly, and try to stay away from convenient food and fast food.  Get yourself a copy of the [place] diet book, or look into the DASH diet.    Also, interested in time that you use to do some exercises, even just brisk walking 10 minutes a day in the morning, and then again 10 minutes in the evening, for a total of 20 minutes of brisk walking each day.  This will be especially easy to do once the weather warms up and we will have more daylight.    I am looking forward to seeing you and the results of your efforts, above.  Call me please with any questions or concerns.  I will call you with results and possible changes.  Please come back in 2 months.  Until then, I hope you have a blessed Lent and a happy Easter.            0  Return in 2 months.  20 minutes please.  Review fasting laboratory results.  Reassess smoking, sleep, mood, energy, function, offer pain management, psychiatry, once more.  Review preventive strategies, cardiovascular risk.  Consider weight management.            0

## 2023-03-21 NOTE — PATIENT INSTRUCTIONS
Thank you very much for coming.  I am very happy to see you.    The pain that you had at the angle of your jaw is typical for irritation of your right ear.  It could also be related to your throat, and could be related to smoking, as well as allergies.  It will all depend on what accompanying symptoms you might have.    The best first thing to do of course is to try to minimize smoking.  Try your best to cut back to as close as half a pack a day.  Remember to use your NICOTINE PATCH at bedtime, and keep it on as long as possible until you have your next cigarette on the next day.  Never smoked with the nicotine patch on, because it will just make you miserable.    Also, drink lots of fluids throughout the day, and do lots of warm salt water gargles.  The active drinking warm liquids and clearing your throat, these are all very good for your throat and your ears and your sinuses.    You are overdue for FASTING laboratory examinations.  Go ahead and have them done at any time, and once I see the results, I will send word and let you know if there are any changes that need to be done.    Please come back in 2 months.  I would like to work on your efforts to quit smoking finally, and I also want to review your laboratory results, and also, we should discuss preventive strategies, including weight management.  Until then, please continue to take care of yourself.  Get lots of rest and sleep.  Drink lots of fluids throughout the day.  Eat more sensibly, and try to stay away from convenient food and fast food.  Get yourself a copy of the [place] diet book, or look into the DASH diet.    Also, interested in time that you use to do some exercises, even just brisk walking 10 minutes a day in the morning, and then again 10 minutes in the evening, for a total of 20 minutes of brisk walking each day.  This will be especially easy to do once the weather warms up and we will have more daylight.    I am looking forward to  seeing you and the results of your efforts, above.  Call me please with any questions or concerns.  I will call you with results and possible changes.  Please come back in 2 months.  Until then, I hope you have a blessed Lent and a happy Easter.            0  Return in 2 months.  20 minutes please.  Review fasting laboratory results.  Reassess smoking, sleep, mood, energy, function, offer pain management, psychiatry, once more.  Review preventive strategies, cardiovascular risk.  Consider weight management.            0

## 2023-03-21 NOTE — PROGRESS NOTES
"Subjective   Patient ID: Althea Jiménez is a 47 y.o. female who presents for Follow-up.    HPI     Review of Systems    Objective   /88 (BP Location: Right arm, Patient Position: Sitting, BP Cuff Size: Adult)   Pulse 98   Resp 18   Ht 1.651 m (5' 5\")   Wt 108 kg (238 lb)   SpO2 97%   BMI 39.61 kg/m²     Physical Exam    Assessment/Plan   Problem List Items Addressed This Visit          Nervous    Chronic pain syndrome    Relevant Medications    traZODone (Desyrel) 50 mg tablet    Other Relevant Orders    Follow Up In Primary Care       Endocrine/Metabolic    Vitamin D deficiency    Relevant Medications    ergocalciferol (Vitamin D-2) 1.25 MG (47581 UT) capsule    Other Relevant Orders    Follow Up In Primary Care       Other    Adjustment insomnia    Relevant Medications    traZODone (Desyrel) 50 mg tablet    Other Relevant Orders    Follow Up In Primary Care    Cigarette smoker motivated to quit - Primary    Relevant Orders    Follow Up In Primary Care          "

## 2023-03-29 ENCOUNTER — OFFICE VISIT (OUTPATIENT)
Dept: PRIMARY CARE | Facility: CLINIC | Age: 48
End: 2023-03-29
Payer: COMMERCIAL

## 2023-03-29 ENCOUNTER — LAB (OUTPATIENT)
Dept: LAB | Facility: LAB | Age: 48
End: 2023-03-29
Payer: COMMERCIAL

## 2023-03-29 VITALS
OXYGEN SATURATION: 95 % | SYSTOLIC BLOOD PRESSURE: 106 MMHG | HEART RATE: 83 BPM | HEIGHT: 65 IN | WEIGHT: 251 LBS | DIASTOLIC BLOOD PRESSURE: 75 MMHG | RESPIRATION RATE: 20 BRPM | BODY MASS INDEX: 41.82 KG/M2

## 2023-03-29 DIAGNOSIS — D75.89 MACROCYTOSIS WITHOUT ANEMIA: ICD-10-CM

## 2023-03-29 DIAGNOSIS — E78.2 MIXED HYPERLIPIDEMIA: ICD-10-CM

## 2023-03-29 DIAGNOSIS — H60.11 CELLULITIS OF RIGHT PINNA: ICD-10-CM

## 2023-03-29 DIAGNOSIS — R74.01 TRANSAMINASEMIA: ICD-10-CM

## 2023-03-29 DIAGNOSIS — R59.0 LYMPHADENOPATHY, CERVICAL: ICD-10-CM

## 2023-03-29 DIAGNOSIS — E55.9 VITAMIN D DEFICIENCY: ICD-10-CM

## 2023-03-29 DIAGNOSIS — R73.9 HYPERGLYCEMIA: ICD-10-CM

## 2023-03-29 DIAGNOSIS — E86.0 DEHYDRATION: ICD-10-CM

## 2023-03-29 DIAGNOSIS — H60.11 CELLULITIS OF RIGHT PINNA: Primary | ICD-10-CM

## 2023-03-29 DIAGNOSIS — F17.210 CIGARETTE NICOTINE DEPENDENCE WITHOUT COMPLICATION: ICD-10-CM

## 2023-03-29 LAB
ALANINE AMINOTRANSFERASE (SGPT) (U/L) IN SER/PLAS: 78 U/L (ref 7–45)
ALBUMIN (G/DL) IN SER/PLAS: 3.7 G/DL (ref 3.4–5)
ALKALINE PHOSPHATASE (U/L) IN SER/PLAS: 107 U/L (ref 33–110)
ANION GAP IN SER/PLAS: 12 MMOL/L (ref 10–20)
ASPARTATE AMINOTRANSFERASE (SGOT) (U/L) IN SER/PLAS: 64 U/L (ref 9–39)
BASOPHILS (10*3/UL) IN BLOOD BY AUTOMATED COUNT: 0.03 X10E9/L (ref 0–0.1)
BASOPHILS/100 LEUKOCYTES IN BLOOD BY AUTOMATED COUNT: 0.4 % (ref 0–2)
BILIRUBIN TOTAL (MG/DL) IN SER/PLAS: 0.5 MG/DL (ref 0–1.2)
CALCIUM (MG/DL) IN SER/PLAS: 9.6 MG/DL (ref 8.6–10.3)
CARBON DIOXIDE, TOTAL (MMOL/L) IN SER/PLAS: 32 MMOL/L (ref 21–32)
CHLORIDE (MMOL/L) IN SER/PLAS: 102 MMOL/L (ref 98–107)
CREATINE KINASE (U/L) IN SER/PLAS: 50 U/L (ref 0–215)
CREATININE (MG/DL) IN SER/PLAS: 0.74 MG/DL (ref 0.5–1.05)
EOSINOPHILS (10*3/UL) IN BLOOD BY AUTOMATED COUNT: 0.26 X10E9/L (ref 0–0.7)
EOSINOPHILS/100 LEUKOCYTES IN BLOOD BY AUTOMATED COUNT: 3.4 % (ref 0–6)
ERYTHROCYTE DISTRIBUTION WIDTH (RATIO) BY AUTOMATED COUNT: 13.2 % (ref 11.5–14.5)
ERYTHROCYTE MEAN CORPUSCULAR HEMOGLOBIN CONCENTRATION (G/DL) BY AUTOMATED: 33.8 G/DL (ref 32–36)
ERYTHROCYTE MEAN CORPUSCULAR VOLUME (FL) BY AUTOMATED COUNT: 106 FL (ref 80–100)
ERYTHROCYTES (10*6/UL) IN BLOOD BY AUTOMATED COUNT: 4.38 X10E12/L (ref 4–5.2)
GFR FEMALE: >90 ML/MIN/1.73M2
GLUCOSE (MG/DL) IN SER/PLAS: 91 MG/DL (ref 74–99)
HEMATOCRIT (%) IN BLOOD BY AUTOMATED COUNT: 46.5 % (ref 36–46)
HEMOGLOBIN (G/DL) IN BLOOD: 15.7 G/DL (ref 12–16)
IMMATURE GRANULOCYTES/100 LEUKOCYTES IN BLOOD BY AUTOMATED COUNT: 0.6 % (ref 0–0.9)
LEUKOCYTES (10*3/UL) IN BLOOD BY AUTOMATED COUNT: 7.7 X10E9/L (ref 4.4–11.3)
LYMPHOCYTES (10*3/UL) IN BLOOD BY AUTOMATED COUNT: 1.8 X10E9/L (ref 1.2–4.8)
LYMPHOCYTES/100 LEUKOCYTES IN BLOOD BY AUTOMATED COUNT: 23.3 % (ref 13–44)
MAGNESIUM (MG/DL) IN SER/PLAS: 2.13 MG/DL (ref 1.6–2.4)
MONOCYTES (10*3/UL) IN BLOOD BY AUTOMATED COUNT: 0.39 X10E9/L (ref 0.1–1)
MONOCYTES/100 LEUKOCYTES IN BLOOD BY AUTOMATED COUNT: 5 % (ref 2–10)
NEUTROPHILS (10*3/UL) IN BLOOD BY AUTOMATED COUNT: 5.2 X10E9/L (ref 1.2–7.7)
NEUTROPHILS/100 LEUKOCYTES IN BLOOD BY AUTOMATED COUNT: 67.3 % (ref 40–80)
PLATELETS (10*3/UL) IN BLOOD AUTOMATED COUNT: 258 X10E9/L (ref 150–450)
POTASSIUM (MMOL/L) IN SER/PLAS: 4.3 MMOL/L (ref 3.5–5.3)
PROTEIN TOTAL: 6.8 G/DL (ref 6.4–8.2)
SODIUM (MMOL/L) IN SER/PLAS: 142 MMOL/L (ref 136–145)
UREA NITROGEN (MG/DL) IN SER/PLAS: 7 MG/DL (ref 6–23)

## 2023-03-29 PROCEDURE — 83036 HEMOGLOBIN GLYCOSYLATED A1C: CPT

## 2023-03-29 PROCEDURE — 82306 VITAMIN D 25 HYDROXY: CPT

## 2023-03-29 PROCEDURE — 84443 ASSAY THYROID STIM HORMONE: CPT

## 2023-03-29 PROCEDURE — 3074F SYST BP LT 130 MM HG: CPT | Performed by: INTERNAL MEDICINE

## 2023-03-29 PROCEDURE — 82550 ASSAY OF CK (CPK): CPT

## 2023-03-29 PROCEDURE — 80053 COMPREHEN METABOLIC PANEL: CPT

## 2023-03-29 PROCEDURE — 82746 ASSAY OF FOLIC ACID SERUM: CPT

## 2023-03-29 PROCEDURE — 83735 ASSAY OF MAGNESIUM: CPT

## 2023-03-29 PROCEDURE — 82607 VITAMIN B-12: CPT

## 2023-03-29 PROCEDURE — 4004F PT TOBACCO SCREEN RCVD TLK: CPT | Performed by: INTERNAL MEDICINE

## 2023-03-29 PROCEDURE — 99214 OFFICE O/P EST MOD 30 MIN: CPT | Performed by: INTERNAL MEDICINE

## 2023-03-29 PROCEDURE — 85025 COMPLETE CBC W/AUTO DIFF WBC: CPT

## 2023-03-29 PROCEDURE — 36415 COLL VENOUS BLD VENIPUNCTURE: CPT

## 2023-03-29 PROCEDURE — 3078F DIAST BP <80 MM HG: CPT | Performed by: INTERNAL MEDICINE

## 2023-03-29 RX ORDER — LEVOFLOXACIN 750 MG/1
750 TABLET ORAL
Qty: 14 TABLET | Refills: 0 | Status: SHIPPED | OUTPATIENT
Start: 2023-03-29 | End: 2023-04-12

## 2023-03-29 NOTE — PROGRESS NOTES
Subjective   Patient ID: Althea Jiménez is a 47 y.o. female who presents for Pain (Patient is here today with pain,red and swelling both ears, after wearing earrings.).    HPI     Review of Systems    Objective   There were no vitals taken for this visit.    Physical Exam    Assessment/Plan

## 2023-03-29 NOTE — PATIENT INSTRUCTIONS
Thank you very much for coming.  I am glad that you are here.    Indeed, you have a significant infection affecting the right earlobe, which has extended down to the angle of your jaw, and lower down your neck.  You probably have lymph nodes affected, that is why the neck is painful to the touch.    BLOOD examination today.  I will call with results and possible changes.    In the future, you might need an ultrasound of your neck.  For now, it will not change the way I will treat you, and so we will not order it unless there are further changes or persistence of tenderness of your neck.    REMEMBER, FEVER 101 °F or higher, this is grounds for an ER visit, very important.  ALSO, REMEMBER NECK STIFFNESS is also grounds for an ER visit, very important.    This is important, because an infection of the head and neck is high risk for involvement of your brain.    In the meantime, LEVOFLOXACIN 750 mg with SUPPER every evening will be your antibiotic of choice, especially since you are allergic to penicillin.    You will benefit from IBUPROFEN not only for pain, but for decrease of inflammation.  200 mg capsule, take 3 capsules per dose or 600 mg, every 6-8 hours as needed, up to 3 doses in 24 hours.    Likewise, you have to drink lots of fluids, and get lots of rest and sleep.  A moist heating pad along the right side of your neck and face will also help greatly.    ALSO, very importantly, if you could stop smoking, or at least cut back significantly, this will help you get better faster.    No more jewelry please.    Please come back in 1 WEEK.  Of course, if you end up seeing a specialist or go to the ER, our plans will change, but if you start responding slowly but consistently, let me see you again in 1 week to make sure that you do not need any further help from ENT or general surgery or an ultrasound of your neck.    I hope you can manage a blessed Lent and a happy Easter.  Until then, please call as needed, and  please continue to take care of yourself and your family.            0  Acute illness and patient who does have some signs of debility, with last laboratory examinations about 6 months ago or even longer.  Remarkably, no fever, and currently, some minimal stiffness, but no nuchal rigidity.  Likewise, no history of confusion.  Patient completely alert, oriented x3, and understands the possible gravity of the situation.    Plans as above.  Return in 1 week.  20 minutes please.  Reassess debility.  Consider ultrasound neck.  Consider ENT or GS evaluation.            0

## 2023-03-29 NOTE — PROGRESS NOTES
"Subjective   Patient ID: Althea Jiménez is a 47 y.o. female who presents for Pain (Patient is here today with pain,red and swelling both ears, after wearing earrings.).    HPI     Review of Systems    Objective   /75 (BP Location: Left arm, Patient Position: Sitting, BP Cuff Size: Adult)   Pulse 83   Resp 20   Ht 1.651 m (5' 5\")   Wt 114 kg (251 lb)   SpO2 95%   BMI 41.77 kg/m²     Physical Exam    Assessment/Plan   Problem List Items Addressed This Visit          Endocrine/Metabolic    Vitamin D deficiency    Relevant Orders    Vitamin D 25-Hydroxy,Total    Magnesium    Follow Up In Primary Care       Hematologic    Macrocytosis without anemia    Relevant Orders    CBC and Auto Differential    Vitamin B12    Folate    TSH with reflex to Free T4 if abnormal    Magnesium    Follow Up In Primary Care       Other    Hyperglycemia    Relevant Orders    Comprehensive Metabolic Panel    Hemoglobin A1C    TSH with reflex to Free T4 if abnormal    Follow Up In Primary Care    Mixed hyperlipidemia    Relevant Orders    Comprehensive Metabolic Panel    TSH with reflex to Free T4 if abnormal    Follow Up In Primary Care    Nicotine dependence    Relevant Orders    Follow Up In Primary Care    Transaminasemia    Relevant Orders    Comprehensive Metabolic Panel    Follow Up In Primary Care     Other Visit Diagnoses       Cellulitis of right pinna    -  Primary    Relevant Medications    levoFLOXacin (Levaquin) 750 mg tablet    Other Relevant Orders    CBC and Auto Differential    Creatine Kinase    Magnesium    Follow Up In Primary Care    Lymphadenopathy, cervical        Relevant Medications    levoFLOXacin (Levaquin) 750 mg tablet    Other Relevant Orders    CBC and Auto Differential    Follow Up In Primary Care    Dehydration        Relevant Orders    Magnesium    Follow Up In Primary Care             Thank you very much for coming.  I am glad that you are here.    Indeed, you have a significant infection " affecting the right earlobe, which has extended down to the angle of your jaw, and lower down your neck.  You probably have lymph nodes affected, that is why the neck is painful to the touch.    BLOOD examination today.  I will call with results and possible changes.    In the future, you might need an ultrasound of your neck.  For now, it will not change the way I will treat you, and so we will not order it unless there are further changes or persistence of tenderness of your neck.    REMEMBER, FEVER 101 °F or higher, this is grounds for an ER visit, very important.  ALSO, REMEMBER NECK STIFFNESS is also grounds for an ER visit, very important.    This is important, because an infection of the head and neck is high risk for involvement of your brain.    In the meantime, LEVOFLOXACIN 750 mg with SUPPER every evening will be your antibiotic of choice, especially since you are allergic to penicillin.    You will benefit from IBUPROFEN not only for pain, but for decrease of inflammation.  200 mg capsule, take 3 capsules per dose or 600 mg, every 6-8 hours as needed, up to 3 doses in 24 hours.    Likewise, you have to drink lots of fluids, and get lots of rest and sleep.  A moist heating pad along the right side of your neck and face will also help greatly.    ALSO, very importantly, if you could stop smoking, or at least cut back significantly, this will help you get better faster.    No more jewelry please.    Please come back in 1 WEEK.  Of course, if you end up seeing a specialist or go to the ER, our plans will change, but if you start responding slowly but consistently, let me see you again in 1 week to make sure that you do not need any further help from ENT or general surgery or an ultrasound of your neck.    I hope you can manage a blessed Lent and a happy Easter.  Until then, please call as needed, and please continue to take care of yourself and your family.            0  Acute illness and patient who does  have some signs of debility, with last laboratory examinations about 6 months ago or even longer.  Remarkably, no fever, and currently, some minimal stiffness, but no nuchal rigidity.  Likewise, no history of confusion.  Patient completely alert, oriented x3, and understands the possible gravity of the situation.    Plans as above.  Return in 1 week.  20 minutes please.  Reassess debility.  Consider ultrasound neck.  Consider ENT or GS evaluation.            0

## 2023-03-30 LAB
CALCIDIOL (25 OH VITAMIN D3) (NG/ML) IN SER/PLAS: 25 NG/ML
COBALAMIN (VITAMIN B12) (PG/ML) IN SER/PLAS: 392 PG/ML (ref 211–911)
ESTIMATED AVERAGE GLUCOSE FOR HBA1C: 105 MG/DL
FOLATE (NG/ML) IN SER/PLAS: 4.9 NG/ML
HEMOGLOBIN A1C/HEMOGLOBIN TOTAL IN BLOOD: 5.3 %
THYROTROPIN (MIU/L) IN SER/PLAS BY DETECTION LIMIT <= 0.05 MIU/L: 3 MIU/L (ref 0.44–3.98)

## 2023-04-05 ENCOUNTER — OFFICE VISIT (OUTPATIENT)
Dept: PRIMARY CARE | Facility: CLINIC | Age: 48
End: 2023-04-05
Payer: COMMERCIAL

## 2023-04-05 VITALS
HEART RATE: 93 BPM | WEIGHT: 248 LBS | OXYGEN SATURATION: 95 % | HEIGHT: 65 IN | BODY MASS INDEX: 41.32 KG/M2 | SYSTOLIC BLOOD PRESSURE: 120 MMHG | DIASTOLIC BLOOD PRESSURE: 84 MMHG

## 2023-04-05 DIAGNOSIS — E78.2 MIXED HYPERLIPIDEMIA: ICD-10-CM

## 2023-04-05 DIAGNOSIS — H60.11 CELLULITIS OF RIGHT PINNA: Primary | ICD-10-CM

## 2023-04-05 DIAGNOSIS — R74.01 TRANSAMINASEMIA: ICD-10-CM

## 2023-04-05 DIAGNOSIS — F17.210 CIGARETTE NICOTINE DEPENDENCE WITHOUT COMPLICATION: ICD-10-CM

## 2023-04-05 DIAGNOSIS — E53.8 FOLIC ACID DEFICIENCY: ICD-10-CM

## 2023-04-05 DIAGNOSIS — K76.0 FATTY INFILTRATION OF LIVER: ICD-10-CM

## 2023-04-05 DIAGNOSIS — F10.90 HABITUAL ALCOHOL USE: ICD-10-CM

## 2023-04-05 DIAGNOSIS — E55.9 VITAMIN D DEFICIENCY: ICD-10-CM

## 2023-04-05 PROCEDURE — 3079F DIAST BP 80-89 MM HG: CPT | Performed by: INTERNAL MEDICINE

## 2023-04-05 PROCEDURE — 3074F SYST BP LT 130 MM HG: CPT | Performed by: INTERNAL MEDICINE

## 2023-04-05 PROCEDURE — 99213 OFFICE O/P EST LOW 20 MIN: CPT | Performed by: INTERNAL MEDICINE

## 2023-04-05 PROCEDURE — 4004F PT TOBACCO SCREEN RCVD TLK: CPT | Performed by: INTERNAL MEDICINE

## 2023-04-05 RX ORDER — MULTIVITAMIN/IRON/FOLIC ACID 18MG-0.4MG
1 TABLET ORAL
Start: 2023-04-05 | End: 2024-04-04

## 2023-04-05 RX ORDER — FOLIC ACID 1 MG/1
1 TABLET ORAL
Qty: 90 TABLET | Refills: 3 | Status: SHIPPED | OUTPATIENT
Start: 2023-04-05 | End: 2024-04-04

## 2023-04-05 NOTE — PROGRESS NOTES
"Subjective   Patient ID: Althea Jiménez is a 47 y.o. female who presents for 1 Week FU (Pt in today for 1 week FU).    HPI   Here for evaluation of cellulitis, right ear.  Doing much better.  States that in the interim, the area burst, and seropustular material was discharged.  Otherwise, CONSTITUTIONALLY, no fever, no chills.  No night sweats.  No lingering anorexia or nausea.  No apparent lymphadenopathy.  No apparent weight loss.    In the meantime, laboratory results noted.  The patient does admit to some binge alcohol intake, but denies any GI symptoms.  Appetite preserved, with no nausea, vomiting, abdominal distress.  No diarrhea, no constipation.  No apparent blood in stool.  No apparent weight loss.  No dysuria, flank, suprapubic pain.  No discharge, no pruritus.  No rash.  No malodor.  No hesitancy, no feeling of incomplete voiding.      Continues to smoke a little more than half a pack a day.  Denies coughing, sputum production.  No ritu substernal chest pain.  No orthopnea, no paroxysmal nocturnal dyspnea.        Review of Systems  Review of systems as in history of present illness, and otherwise, reviewed separately as well, and was unremarkable/negative/noncontributory.          Objective   /84 (BP Location: Left arm, Patient Position: Sitting)   Pulse 93   Ht 1.651 m (5' 5\")   Wt 112 kg (248 lb)   SpO2 95%   BMI 41.27 kg/m²     Physical Exam  In good spirits.  Not in distress or diaphoresis.  Alert, oriented x3.  Amiable.  Not unkempt.  Receptive.  Cheerful.  Appropriate.  Eager to stay healthy and independent.  Moderately obese build.  Does not wish harm to self or others.    HEAD pink palpebral conjunctivae, anicteric sclerae.  Induration, swelling affecting right pinna much less, with only minimal tenderness.  NECK supple, no apparent jugular venous distention.  No apparent lymphadenopathy.  CARDIOVASCULAR not in distress or diaphoresis.  No bipedal edema.  LUNGS not in distress or " diaphoresis.  Not using accessory muscles.  ABDOMEN soft, nontender.  BACK no costovertebral angle tenderness.  EXTREMITIES no clubbing, no cyanosis.  NEURO no facial asymmetry.  No apparent cranial nerve deficits.   Ambulating without need of assistance.  No apparent focal weakness.  No tremors.  PSYCH receptive, appropriate, and eager to maintain and improve quality of life.       Assessment/Plan   Problem List Items Addressed This Visit          Digestive    Fatty infiltration of liver    Relevant Orders    Hepatitis panel, acute    Lipid Panel       Endocrine/Metabolic    Vitamin D deficiency    Folic acid deficiency    Relevant Medications    multivitamin-iron-folic acid ([employer])  mg-mcg tablet tablet    folic acid (Folvite) 1 mg tablet       Other    Mixed hyperlipidemia    Relevant Orders    Lipid Panel    Nicotine dependence    Transaminasemia    Relevant Orders    Hepatitis panel, acute    Lipid Panel    Habitual alcohol use     Other Visit Diagnoses       Cellulitis of right pinna    -  Primary    Relevant Medications    bacitracin zinc-polymyxin B 500-10,000 unit/gram ointment             Pt in today for 1 week FU    Thank you very much for coming.  I am very happy to see you.    Your ear looks better.  I am glad that it broke, and the pressure is much much less.    Get yourself some POLYSPORIN, not Neosporin.  Use the gauze sponges that I have given you, and apply some Polysporin, then place it behind your ear.  Keep it there for at least 3 hours.  If it falls off, just get another 1 and apply onto it.    If your right ear gets any worse again, please let me know, and you will need antibiotics by the mouth once more.  You might also need to see a specialist.    Thank you for doing your blood examination.  The results were stable, and they did not show any SYSTEMIC infection.    It did show FOLIC ACID deficiency, making your red blood cells a little bigger than normal.  MULTIVITAMIN like  [facility] [facility] with BREAKFAST every day.  Prescription FOLIC ACID 1 mg tablet with LUNCH every day as well.  Take note that you do not take the multivitamin and the folic acid at the same time, so that you can absorb them more completely.    Please continue to try to work on your smoking.  Try and see if you can manage with only HALF PACK each day.    Your liver is showing some signs of strain, but thankfully, you are not having any symptoms.  The usual things that you might feel include lingering loss of appetite, lingering nausea, even some yellowish discoloration of your eyes and skin.  If you have any of these, let me know, but if urgent, dial 911 first.    Also, try to limit your alcohol intake to 1 drink, sometimes 2 each day.    FASTING blood examinations soon.  I will check your hepatitis panel, as well as your cholesterol panel.    Depending on the results, you may need an ultrasound of your liver, or even an MRI.  Because you will need a COLONOSCOPY sometime soon as well, you may also benefit from seeing a GI doctor.    Please continue taking VITAMIN D as well.    Again, thank you very much for coming.  Please do not hesitate to call with questions or concerns.  Please continue to take care of yourself and your family, and please continue to pray for our recovery from this pandemic.    Do not forget to do my FASTING laboratory examinations anytime soon.  I will send word regarding results and possible changes.  Please keep your appointment in JUNE.  I hope you have a Happy Easter!            0  The patient already has an appointment in June.  FASTING laboratory examinations soon, and consider MRI versus ultrasound, consider GI/hepatology referral.  When patient returns, reassess smoking, possible options regarding weight management.  Consider psychiatry once more, reassess mood, energy, function, sleep, pain management.            0

## 2023-04-05 NOTE — PATIENT INSTRUCTIONS
Thank you very much for coming.  I am very happy to see you.    Your ear looks better.  I am glad that it broke, and the pressure is much much less.    Get yourself some POLYSPORIN, not Neosporin.  Use the gauze sponges that I have given you, and apply some Polysporin, then place it behind your ear.  Keep it there for at least 3 hours.  If it falls off, just get another 1 and apply onto it.    If your right ear gets any worse again, please let me know, and you will need antibiotics by the mouth once more.  You might also need to see a specialist.    Thank you for doing your blood examination.  The results were stable, and they did not show any SYSTEMIC infection.    It did show FOLIC ACID deficiency, making your red blood cells a little bigger than normal.  MULTIVITAMIN like [facility] [facility] with BREAKFAST every day.  Prescription FOLIC ACID 1 mg tablet with LUNCH every day as well.  Take note that you do not take the multivitamin and the folic acid at the same time, so that you can absorb them more completely.    Please continue to try to work on your smoking.  Try and see if you can manage with only HALF PACK each day.    Your liver is showing some signs of strain, but thankfully, you are not having any symptoms.  The usual things that you might feel include lingering loss of appetite, lingering nausea, even some yellowish discoloration of your eyes and skin.  If you have any of these, let me know, but if urgent, dial 911 first.    Also, try to limit your alcohol intake to 1 drink, sometimes 2 each day.    FASTING blood examinations soon.  I will check your hepatitis panel, as well as your cholesterol panel.    Depending on the results, you may need an ultrasound of your liver, or even an MRI.  Because you will need a COLONOSCOPY sometime soon as well, you may also benefit from seeing a GI doctor.    Please continue taking VITAMIN D as well.    Again, thank you very much for coming.  Please do not hesitate to call  with questions or concerns.  Please continue to take care of yourself and your family, and please continue to pray for our recovery from this pandemic.    Do not forget to do my FASTING laboratory examinations anytime soon.  I will send word regarding results and possible changes.  Please keep your appointment in JUNE.  I hope you have a Happy Easter!            0  The patient already has an appointment in June.  FASTING laboratory examinations soon, and consider MRI versus ultrasound, consider GI/hepatology referral.  When patient returns, reassess smoking, possible options regarding weight management.  Consider psychiatry once more, reassess mood, energy, function, sleep, pain management.            0

## 2024-02-28 ENCOUNTER — HOSPITAL ENCOUNTER (EMERGENCY)
Facility: HOSPITAL | Age: 49
Discharge: HOME | End: 2024-02-29
Attending: STUDENT IN AN ORGANIZED HEALTH CARE EDUCATION/TRAINING PROGRAM
Payer: COMMERCIAL

## 2024-02-28 ENCOUNTER — APPOINTMENT (OUTPATIENT)
Dept: RADIOLOGY | Facility: HOSPITAL | Age: 49
End: 2024-02-28
Payer: COMMERCIAL

## 2024-02-28 VITALS
HEIGHT: 65 IN | BODY MASS INDEX: 36.15 KG/M2 | RESPIRATION RATE: 18 BRPM | SYSTOLIC BLOOD PRESSURE: 99 MMHG | WEIGHT: 217 LBS | OXYGEN SATURATION: 94 % | DIASTOLIC BLOOD PRESSURE: 77 MMHG | HEART RATE: 91 BPM | TEMPERATURE: 96.6 F

## 2024-02-28 DIAGNOSIS — K11.20 PAROTIDITIS: Primary | ICD-10-CM

## 2024-02-28 LAB
ALBUMIN SERPL BCP-MCNC: 3.9 G/DL (ref 3.4–5)
ALP SERPL-CCNC: 111 U/L (ref 33–110)
ALT SERPL W P-5'-P-CCNC: 23 U/L (ref 7–45)
AMYLASE SERPL-CCNC: 315 U/L (ref 29–103)
ANION GAP SERPL CALC-SCNC: 12 MMOL/L (ref 10–20)
AST SERPL W P-5'-P-CCNC: 22 U/L (ref 9–39)
BASOPHILS # BLD AUTO: 0.06 X10*3/UL (ref 0–0.1)
BASOPHILS NFR BLD AUTO: 0.6 %
BILIRUB SERPL-MCNC: 0.3 MG/DL (ref 0–1.2)
BUN SERPL-MCNC: 13 MG/DL (ref 6–23)
CALCIUM SERPL-MCNC: 9.8 MG/DL (ref 8.6–10.3)
CHLORIDE SERPL-SCNC: 104 MMOL/L (ref 98–107)
CO2 SERPL-SCNC: 24 MMOL/L (ref 21–32)
CREAT SERPL-MCNC: 0.86 MG/DL (ref 0.5–1.05)
EGFRCR SERPLBLD CKD-EPI 2021: 82 ML/MIN/1.73M*2
EOSINOPHIL # BLD AUTO: 0.24 X10*3/UL (ref 0–0.7)
EOSINOPHIL NFR BLD AUTO: 2.3 %
ERYTHROCYTE [DISTWIDTH] IN BLOOD BY AUTOMATED COUNT: 13.3 % (ref 11.5–14.5)
GLUCOSE SERPL-MCNC: 104 MG/DL (ref 74–99)
HCT VFR BLD AUTO: 47.5 % (ref 36–46)
HGB BLD-MCNC: 16.8 G/DL (ref 12–16)
IMM GRANULOCYTES # BLD AUTO: 0.04 X10*3/UL (ref 0–0.7)
IMM GRANULOCYTES NFR BLD AUTO: 0.4 % (ref 0–0.9)
LYMPHOCYTES # BLD AUTO: 3.57 X10*3/UL (ref 1.2–4.8)
LYMPHOCYTES NFR BLD AUTO: 34.1 %
MCH RBC QN AUTO: 34.9 PG (ref 26–34)
MCHC RBC AUTO-ENTMCNC: 35.4 G/DL (ref 32–36)
MCV RBC AUTO: 99 FL (ref 80–100)
MONOCYTES # BLD AUTO: 0.57 X10*3/UL (ref 0.1–1)
MONOCYTES NFR BLD AUTO: 5.4 %
NEUTROPHILS # BLD AUTO: 5.99 X10*3/UL (ref 1.2–7.7)
NEUTROPHILS NFR BLD AUTO: 57.2 %
NRBC BLD-RTO: 0 /100 WBCS (ref 0–0)
PLATELET # BLD AUTO: 299 X10*3/UL (ref 150–450)
POTASSIUM SERPL-SCNC: 3.6 MMOL/L (ref 3.5–5.3)
PROT SERPL-MCNC: 7.3 G/DL (ref 6.4–8.2)
RBC # BLD AUTO: 4.82 X10*6/UL (ref 4–5.2)
SODIUM SERPL-SCNC: 136 MMOL/L (ref 136–145)
WBC # BLD AUTO: 10.5 X10*3/UL (ref 4.4–11.3)

## 2024-02-28 PROCEDURE — 87040 BLOOD CULTURE FOR BACTERIA: CPT | Mod: ELYLAB | Performed by: PHYSICIAN ASSISTANT

## 2024-02-28 PROCEDURE — 2500000004 HC RX 250 GENERAL PHARMACY W/ HCPCS (ALT 636 FOR OP/ED): Performed by: PHYSICIAN ASSISTANT

## 2024-02-28 PROCEDURE — 96361 HYDRATE IV INFUSION ADD-ON: CPT

## 2024-02-28 PROCEDURE — 70491 CT SOFT TISSUE NECK W/DYE: CPT

## 2024-02-28 PROCEDURE — 36415 COLL VENOUS BLD VENIPUNCTURE: CPT | Performed by: PHYSICIAN ASSISTANT

## 2024-02-28 PROCEDURE — 99284 EMERGENCY DEPT VISIT MOD MDM: CPT | Mod: 25

## 2024-02-28 PROCEDURE — 85025 COMPLETE CBC W/AUTO DIFF WBC: CPT | Performed by: PHYSICIAN ASSISTANT

## 2024-02-28 PROCEDURE — 70491 CT SOFT TISSUE NECK W/DYE: CPT | Performed by: STUDENT IN AN ORGANIZED HEALTH CARE EDUCATION/TRAINING PROGRAM

## 2024-02-28 PROCEDURE — 96375 TX/PRO/DX INJ NEW DRUG ADDON: CPT

## 2024-02-28 PROCEDURE — 82150 ASSAY OF AMYLASE: CPT | Performed by: PHYSICIAN ASSISTANT

## 2024-02-28 PROCEDURE — 84075 ASSAY ALKALINE PHOSPHATASE: CPT | Performed by: PHYSICIAN ASSISTANT

## 2024-02-28 PROCEDURE — 96374 THER/PROPH/DIAG INJ IV PUSH: CPT | Mod: 59

## 2024-02-28 PROCEDURE — 2550000001 HC RX 255 CONTRASTS: Performed by: STUDENT IN AN ORGANIZED HEALTH CARE EDUCATION/TRAINING PROGRAM

## 2024-02-28 RX ORDER — MORPHINE SULFATE 4 MG/ML
4 INJECTION, SOLUTION INTRAMUSCULAR; INTRAVENOUS ONCE
Status: COMPLETED | OUTPATIENT
Start: 2024-02-28 | End: 2024-02-28

## 2024-02-28 RX ORDER — ONDANSETRON HYDROCHLORIDE 2 MG/ML
4 INJECTION, SOLUTION INTRAVENOUS ONCE
Status: COMPLETED | OUTPATIENT
Start: 2024-02-28 | End: 2024-02-28

## 2024-02-28 RX ADMIN — MORPHINE SULFATE 4 MG: 4 INJECTION, SOLUTION INTRAMUSCULAR; INTRAVENOUS at 22:48

## 2024-02-28 RX ADMIN — SODIUM CHLORIDE 1000 ML: 9 INJECTION, SOLUTION INTRAVENOUS at 22:35

## 2024-02-28 RX ADMIN — ONDANSETRON 4 MG: 2 INJECTION INTRAMUSCULAR; INTRAVENOUS at 22:48

## 2024-02-28 RX ADMIN — IOHEXOL 75 ML: 350 INJECTION, SOLUTION INTRAVENOUS at 23:21

## 2024-02-28 ASSESSMENT — PAIN - FUNCTIONAL ASSESSMENT
PAIN_FUNCTIONAL_ASSESSMENT: 0-10
PAIN_FUNCTIONAL_ASSESSMENT: 0-10

## 2024-02-28 ASSESSMENT — PAIN DESCRIPTION - LOCATION: LOCATION: NECK

## 2024-02-28 ASSESSMENT — PAIN SCALES - GENERAL
PAINLEVEL_OUTOF10: 6
PAINLEVEL_OUTOF10: 2

## 2024-02-28 ASSESSMENT — PAIN DESCRIPTION - ORIENTATION: ORIENTATION: RIGHT;LOWER

## 2024-02-28 ASSESSMENT — COLUMBIA-SUICIDE SEVERITY RATING SCALE - C-SSRS
1. IN THE PAST MONTH, HAVE YOU WISHED YOU WERE DEAD OR WISHED YOU COULD GO TO SLEEP AND NOT WAKE UP?: NO
2. HAVE YOU ACTUALLY HAD ANY THOUGHTS OF KILLING YOURSELF?: NO
6. HAVE YOU EVER DONE ANYTHING, STARTED TO DO ANYTHING, OR PREPARED TO DO ANYTHING TO END YOUR LIFE?: NO

## 2024-02-28 ASSESSMENT — PAIN DESCRIPTION - DESCRIPTORS: DESCRIPTORS: ACHING

## 2024-02-29 RX ORDER — HYDROCODONE BITARTRATE AND ACETAMINOPHEN 5; 325 MG/1; MG/1
1 TABLET ORAL EVERY 6 HOURS PRN
Qty: 8 TABLET | Refills: 0 | Status: SHIPPED | OUTPATIENT
Start: 2024-02-29 | End: 2024-03-02

## 2024-02-29 ASSESSMENT — LIFESTYLE VARIABLES
EVER HAD A DRINK FIRST THING IN THE MORNING TO STEADY YOUR NERVES TO GET RID OF A HANGOVER: NO
HAVE PEOPLE ANNOYED YOU BY CRITICIZING YOUR DRINKING: NO
EVER FELT BAD OR GUILTY ABOUT YOUR DRINKING: NO
HAVE YOU EVER FELT YOU SHOULD CUT DOWN ON YOUR DRINKING: NO

## 2024-02-29 NOTE — ED PROVIDER NOTES
"HPI   Chief Complaint   Patient presents with   • Abscess     Pt has abscess to right  lower jaw x 1 week. Seen in urgent care and treated for a \" cold\" with ATB Cefuroxime, started, pt states she has no flu symptoms.        48-year-old female presents emergency room chief complaint of increased swelling and pain to the right side of her jaw x 1 week.  The patient states she went to urgent care a few days ago and was started on cefuroxime and diagnosed with a cold.  Patient states she is taking antibiotics with no improvement of her symptoms.  The swelling has been getting worse over the past 24 hours.  She denies any sore throat or dysphagia, she denies any dental pain.  She denies any fevers, chills, chest pain, shortness of breath, abdominal pain, nausea, vomiting or diarrhea.  Her jaw pain is 6 out of a 10.  She has been taking ibuprofen with little relief of symptoms.  She has a history of adjustment insomnia, hypertension, bronchospasm, cervical radiculopathy, chronic mastitis, nicotine abuse, fatigue, depression with anxiety, macrocytosis, elevated triglycerides hyperlipidemia chronic pain syndrome chronic neck pain, lung nodule in the right side, polycythemia, polymyalgia, sacroiliitis, sinus congestion, transaminasemia, vitamin D deficiency, folic acid deficiency, fatty filtration liver and habitual alcohol use.  Allergies naproxen reaction anaphylaxis, sulfa reaction anaphylaxis, lidocaine headache, penicillins hives, procaine other.  She does tolerate Motrin      History provided by:  Patient and medical records                      Avni Coma Scale Score: 15                     Patient History   History reviewed. No pertinent past medical history.  Past Surgical History:   Procedure Laterality Date   • OTHER SURGICAL HISTORY  2019     section   • OTHER SURGICAL HISTORY  2019    Split thickness skin graft   • OTHER SURGICAL HISTORY  2019    Oral surgery   • OTHER SURGICAL " HISTORY  11/20/2019    Breast surgery   • OTHER SURGICAL HISTORY  10/09/2019    Wrist surgery     Family History   Problem Relation Name Age of Onset   • Rheum arthritis Mother     • Other (muscular disorder) Sister     • Lung cancer Maternal Grandfather       Social History     Tobacco Use   • Smoking status: Every Day     Packs/day: 1.00     Years: 25.00     Additional pack years: 0.00     Total pack years: 25.00     Types: Cigarettes   • Smokeless tobacco: Never   Vaping Use   • Vaping Use: Not on file   Substance Use Topics   • Alcohol use: Yes     Comment: socially   • Drug use: Never       Physical Exam   ED Triage Vitals [02/28/24 2208]   Temperature Heart Rate Respirations BP   35.9 °C (96.6 °F) (!) 119 19 (!) 146/97      Pulse Ox Temp Source Heart Rate Source Patient Position   96 % Tympanic Monitor Sitting      BP Location FiO2 (%)     Right arm --       Physical Exam  Vitals and nursing note reviewed. Exam conducted with a chaperone present.   Constitutional:       General: She is awake. She is not in acute distress.     Appearance: Normal appearance. She is well-developed, well-groomed and normal weight. She is not ill-appearing, toxic-appearing or diaphoretic.   HENT:      Head: Normocephalic and atraumatic.      Jaw: There is normal jaw occlusion. Tenderness, swelling and pain on movement present.      Salivary Glands: Right salivary gland is diffusely enlarged and tender.        Comments: Swelling with tenderness      Right Ear: Hearing, tympanic membrane, ear canal and external ear normal.      Left Ear: Hearing, tympanic membrane, ear canal and external ear normal.      Nose: Nose normal.      Mouth/Throat:      Lips: Pink.      Mouth: Mucous membranes are moist.      Dentition: Normal dentition. No dental tenderness, gingival swelling, dental abscesses or gum lesions.      Tongue: No lesions. Tongue does not deviate from midline.      Palate: No mass and lesions.      Pharynx: Oropharynx is clear.  Uvula midline.      Tonsils: No tonsillar exudate or tonsillar abscesses.      Comments: Missing most of the teeth on the lower jaw, there is some dental caries, no evidence of a dental abscess, no sign of Will angina or acute necrotizing ulcer gingivitis.  Eyes:      Extraocular Movements: Extraocular movements intact.      Conjunctiva/sclera: Conjunctivae normal.      Pupils: Pupils are equal, round, and reactive to light.   Cardiovascular:      Rate and Rhythm: Normal rate and regular rhythm.      Pulses: Normal pulses.      Heart sounds: Normal heart sounds.   Pulmonary:      Effort: Pulmonary effort is normal.      Breath sounds: Normal breath sounds. No wheezing, rhonchi or rales.   Abdominal:      General: Abdomen is flat. Bowel sounds are normal.      Palpations: Abdomen is soft. There is no mass.      Tenderness: There is no abdominal tenderness. There is no guarding.   Musculoskeletal:         General: No swelling or tenderness. Normal range of motion.      Cervical back: Normal range of motion and neck supple.   Lymphadenopathy:      Head:      Right side of head: Submandibular and tonsillar adenopathy present.      Cervical: Cervical adenopathy present.      Right cervical: Superficial cervical adenopathy present.      Upper Body:      Right upper body: No supraclavicular or axillary adenopathy.   Skin:     General: Skin is warm and dry.      Capillary Refill: Capillary refill takes less than 2 seconds.      Findings: No rash.   Neurological:      General: No focal deficit present.      Mental Status: She is alert and oriented to person, place, and time. Mental status is at baseline.   Psychiatric:         Mood and Affect: Mood normal.         Behavior: Behavior normal. Behavior is cooperative.         Thought Content: Thought content normal.         Judgment: Judgment normal.         ED Course & MDM   Diagnoses as of 02/29/24 0016   Parotiditis       Medical Decision Making  ED course: Temperature is  35.9, heart rate 119, respirations 19, /97, pulse ox was 96% on room air the patient's weight is 98.4 kg  Discussed the workup plan with the patient and she agrees I have ordered IV access, she was given a liter normal saline wide open, 4 mg of morphine IV push, 4 mg Zofran IV push.  I have ordered lab work and a CT scan of the neck with IV contrast.  White count 10.5 hemoglobin 16.8 hematocrit 47.5 platelet count was 299, metabolic alk phos 100 glucose 104 otherwise unremarkable amylase was 315  CT scan shows mild asymmetric enlargement hyperemia enhancement of the superficial lobe of the right parotid gland with overlying fat stranding without evidence of associated soft tissue gas or enhancing collections.  Edentulous with large dental cavities and periapical lucencies present in the remaining mandibular teeth without evidence of any inflammatory infectious changes, multilevel degenerative changes in cervical spine with mild to moderate spinal canal stenosis present at level C5-6 due to disc osteophyte complex.  Discussed results of workup with the patient she was advised to continue taking the cefuroxime as prescribed.  She was advised continue taking Motrin, we will put her on some pain medication for the next couple days and have her follow-up with ENT.  She was advised to return back to the ER sooner with any concerns or acute symptoms        Procedure  Procedures     Robert Hawthorne PA-C  02/29/24 0016

## 2024-03-04 LAB
BACTERIA BLD CULT: NORMAL
BACTERIA BLD CULT: NORMAL

## 2024-07-17 ENCOUNTER — OFFICE VISIT (OUTPATIENT)
Dept: OBGYN CLINIC | Age: 49
End: 2024-07-17
Payer: COMMERCIAL

## 2024-07-17 VITALS
SYSTOLIC BLOOD PRESSURE: 124 MMHG | HEIGHT: 63 IN | DIASTOLIC BLOOD PRESSURE: 86 MMHG | BODY MASS INDEX: 38.98 KG/M2 | WEIGHT: 220 LBS

## 2024-07-17 DIAGNOSIS — Z12.4 CERVICAL CANCER SCREENING: ICD-10-CM

## 2024-07-17 DIAGNOSIS — N76.0 OTHER VAGINITIS: ICD-10-CM

## 2024-07-17 DIAGNOSIS — Z11.51 SCREENING FOR HUMAN PAPILLOMAVIRUS: ICD-10-CM

## 2024-07-17 DIAGNOSIS — Z01.419 ENCOUNTER FOR WELL WOMAN EXAM WITH ROUTINE GYNECOLOGICAL EXAM: ICD-10-CM

## 2024-07-17 DIAGNOSIS — Z72.51 UNPROTECTED SEX: ICD-10-CM

## 2024-07-17 DIAGNOSIS — R10.2 PELVIC PAIN IN FEMALE: ICD-10-CM

## 2024-07-17 DIAGNOSIS — Z12.31 SCREENING MAMMOGRAM FOR BREAST CANCER: ICD-10-CM

## 2024-07-17 DIAGNOSIS — Z01.419 ENCOUNTER FOR WELL WOMAN EXAM WITH ROUTINE GYNECOLOGICAL EXAM: Primary | ICD-10-CM

## 2024-07-17 PROCEDURE — 99203 OFFICE O/P NEW LOW 30 MIN: CPT | Performed by: OBSTETRICS & GYNECOLOGY

## 2024-07-17 PROCEDURE — 99386 PREV VISIT NEW AGE 40-64: CPT | Performed by: OBSTETRICS & GYNECOLOGY

## 2024-07-17 RX ORDER — TRAZODONE HYDROCHLORIDE 50 MG/1
50 TABLET ORAL NIGHTLY
COMMUNITY
Start: 2024-06-30

## 2024-07-17 SDOH — ECONOMIC STABILITY: INCOME INSECURITY: HOW HARD IS IT FOR YOU TO PAY FOR THE VERY BASICS LIKE FOOD, HOUSING, MEDICAL CARE, AND HEATING?: NOT HARD AT ALL

## 2024-07-17 SDOH — ECONOMIC STABILITY: FOOD INSECURITY: WITHIN THE PAST 12 MONTHS, THE FOOD YOU BOUGHT JUST DIDN'T LAST AND YOU DIDN'T HAVE MONEY TO GET MORE.: NEVER TRUE

## 2024-07-17 SDOH — ECONOMIC STABILITY: FOOD INSECURITY: WITHIN THE PAST 12 MONTHS, YOU WORRIED THAT YOUR FOOD WOULD RUN OUT BEFORE YOU GOT MONEY TO BUY MORE.: NEVER TRUE

## 2024-07-17 SDOH — ECONOMIC STABILITY: HOUSING INSECURITY
IN THE LAST 12 MONTHS, WAS THERE A TIME WHEN YOU DID NOT HAVE A STEADY PLACE TO SLEEP OR SLEPT IN A SHELTER (INCLUDING NOW)?: NO

## 2024-07-17 ASSESSMENT — PATIENT HEALTH QUESTIONNAIRE - PHQ9
7. TROUBLE CONCENTRATING ON THINGS, SUCH AS READING THE NEWSPAPER OR WATCHING TELEVISION: NOT AT ALL
SUM OF ALL RESPONSES TO PHQ QUESTIONS 1-9: 0
10. IF YOU CHECKED OFF ANY PROBLEMS, HOW DIFFICULT HAVE THESE PROBLEMS MADE IT FOR YOU TO DO YOUR WORK, TAKE CARE OF THINGS AT HOME, OR GET ALONG WITH OTHER PEOPLE: NOT DIFFICULT AT ALL
SUM OF ALL RESPONSES TO PHQ9 QUESTIONS 1 & 2: 0
SUM OF ALL RESPONSES TO PHQ QUESTIONS 1-9: 0
9. THOUGHTS THAT YOU WOULD BE BETTER OFF DEAD, OR OF HURTING YOURSELF: NOT AT ALL
SUM OF ALL RESPONSES TO PHQ QUESTIONS 1-9: 0
6. FEELING BAD ABOUT YOURSELF - OR THAT YOU ARE A FAILURE OR HAVE LET YOURSELF OR YOUR FAMILY DOWN: NOT AT ALL
2. FEELING DOWN, DEPRESSED OR HOPELESS: NOT AT ALL
4. FEELING TIRED OR HAVING LITTLE ENERGY: NOT AT ALL
5. POOR APPETITE OR OVEREATING: NOT AT ALL
8. MOVING OR SPEAKING SO SLOWLY THAT OTHER PEOPLE COULD HAVE NOTICED. OR THE OPPOSITE, BEING SO FIGETY OR RESTLESS THAT YOU HAVE BEEN MOVING AROUND A LOT MORE THAN USUAL: NOT AT ALL
3. TROUBLE FALLING OR STAYING ASLEEP: NOT AT ALL
SUM OF ALL RESPONSES TO PHQ QUESTIONS 1-9: 0
1. LITTLE INTEREST OR PLEASURE IN DOING THINGS: NOT AT ALL

## 2024-07-17 ASSESSMENT — ENCOUNTER SYMPTOMS
CONSTIPATION: 0
ANAL BLEEDING: 0
DIARRHEA: 0
RESPIRATORY NEGATIVE: 1
ABDOMINAL DISTENTION: 0
NAUSEA: 0
ALLERGIC/IMMUNOLOGIC NEGATIVE: 1
ABDOMINAL PAIN: 0
VOMITING: 0
BLOOD IN STOOL: 0
RECTAL PAIN: 0
EYES NEGATIVE: 1

## 2024-07-17 ASSESSMENT — VISUAL ACUITY: OU: 1

## 2024-07-17 NOTE — PROGRESS NOTES
The patient was asked if she would like a chaperone present for her intimate exam. She  Declined the chaperone. Alfredo Melo CMA (AAMA)

## 2024-07-17 NOTE — PROGRESS NOTES
Subjective:      Patient ID: Rebekah Daniel is a 48 y.o. female    Annual exam. Reviewed medical, surgical, social and family history.  Also reviewed current medications and allergies.  No PMB.   Pap performed.  Screening mammogram ordered.  Monthly SBE encouraged.  Screening colonoscopy recommended per routine.  F/U annual exam or prn.      Pt also wished to discuss intermittent pelvic pain x 2 moths.  States pain was sharp, lasted a few hours and then resolved.  No bowel or urinary complaints.  Not currently SA.  Pelvic US ordered and STD screen/wet prep obtained.  F/U US results.  All questions answered.        Vitals:  /86   Ht 1.6 m (5' 3\")   Wt 99.8 kg (220 lb)   LMP  (LMP Unknown)   BMI 38.97 kg/m²   History reviewed. No pertinent past medical history.  Past Surgical History:   Procedure Laterality Date     SECTION          HAND SURGERY      right hand    MASTECTOMY, PARTIAL Left     SKIN GRAFT      US I&D BREAST ABSCESS DEEP  10/07/2019    US I&D BREAST ABSCESS DEEP 10/7/2019     Allergies:  Bactrim [sulfamethoxazole-trimethoprim], Naproxen, Bupivacaine hcl, Lidocaine, and Penicillins  Current Outpatient Medications   Medication Sig Dispense Refill    traZODone (DESYREL) 50 MG tablet Take 1 tablet by mouth nightly       Current Facility-Administered Medications   Medication Dose Route Frequency Provider Last Rate Last Admin    lidocaine PF 1 % injection 10 mL  10 mL Other Once Leonard Yoo MD        sodium bicarbonate 8.4 % injection 0.5 mEq  0.5 mEq IntraVENous Once Leonard Yoo MD         Social History     Socioeconomic History    Marital status: Single     Spouse name: Not on file    Number of children: Not on file    Years of education: Not on file    Highest education level: Not on file   Occupational History    Not on file   Tobacco Use    Smoking status: Every Day     Current packs/day: 1.00     Average packs/day: 1 pack/day for 20.0 years (20.0 ttl pk-yrs)

## 2024-07-18 LAB
CLUE CELLS VAG QL WET PREP: NORMAL
T VAGINALIS VAG QL WET PREP: NORMAL
TRICHOMONAS VAGINALIS SCREEN: NEGATIVE
YEAST VAG QL WET PREP: NORMAL

## 2024-07-20 LAB
C TRACH DNA CVX QL NAA+PROBE: NEGATIVE
HPV HR 12 DNA SPEC QL NAA+PROBE: NOT DETECTED
HPV16 DNA SPEC QL NAA+PROBE: NOT DETECTED
HPV16+18+H RISK 12 DNA SPEC-IMP: NORMAL
HPV18 DNA SPEC QL NAA+PROBE: NOT DETECTED
N GONORRHOEA DNA CERV MUCUS QL NAA+PROBE: NEGATIVE

## 2024-07-24 ENCOUNTER — HOSPITAL ENCOUNTER (OUTPATIENT)
Dept: WOMENS IMAGING | Age: 49
Discharge: HOME | End: 2024-07-26
Payer: COMMERCIAL

## 2024-07-24 ENCOUNTER — HOSPITAL ENCOUNTER (OUTPATIENT)
Dept: ULTRASOUND IMAGING | Age: 49
Discharge: HOME | End: 2024-07-26
Payer: COMMERCIAL

## 2024-07-24 VITALS — HEIGHT: 65 IN | BODY MASS INDEX: 36.61 KG/M2

## 2024-07-24 DIAGNOSIS — R10.2 PELVIC PAIN IN FEMALE: ICD-10-CM

## 2024-07-24 DIAGNOSIS — Z12.31 SCREENING MAMMOGRAM FOR BREAST CANCER: ICD-10-CM

## 2024-07-24 PROCEDURE — 77063 BREAST TOMOSYNTHESIS BI: CPT

## 2024-07-24 PROCEDURE — 93975 VASCULAR STUDY: CPT

## 2024-07-24 PROCEDURE — 76856 US EXAM PELVIC COMPLETE: CPT

## 2024-07-24 PROCEDURE — 76830 TRANSVAGINAL US NON-OB: CPT

## 2024-07-25 ENCOUNTER — OFFICE VISIT (OUTPATIENT)
Dept: OBGYN CLINIC | Age: 49
End: 2024-07-25
Payer: COMMERCIAL

## 2024-07-25 VITALS
WEIGHT: 221 LBS | BODY MASS INDEX: 36.82 KG/M2 | SYSTOLIC BLOOD PRESSURE: 138 MMHG | HEIGHT: 65 IN | DIASTOLIC BLOOD PRESSURE: 84 MMHG

## 2024-07-25 DIAGNOSIS — R10.2 PELVIC PAIN IN FEMALE: Primary | ICD-10-CM

## 2024-07-25 PROCEDURE — 99213 OFFICE O/P EST LOW 20 MIN: CPT | Performed by: OBSTETRICS & GYNECOLOGY

## 2024-07-25 ASSESSMENT — ENCOUNTER SYMPTOMS
VOMITING: 0
DIARRHEA: 0
EYES NEGATIVE: 1
RECTAL PAIN: 0
ANAL BLEEDING: 0
NAUSEA: 0
ALLERGIC/IMMUNOLOGIC NEGATIVE: 1
CONSTIPATION: 0
BLOOD IN STOOL: 0
ABDOMINAL DISTENTION: 0
RESPIRATORY NEGATIVE: 1
ABDOMINAL PAIN: 0

## 2024-07-25 NOTE — PROGRESS NOTES
Patient here for US results for intermittent pelvic pain.  Reviewed medical, surgical, social and family history.  Also reviewed current medications and allergies.  US as follows:    EXAM:     US Pelvis Transabdominal Limited, Pelvis Transvaginal and US Duplex  Arterial/Venous of the Pelvis     EXAM DATE/TIME:     7/24/2024 1:27 pm     CLINICAL HISTORY:     ORDERING SYSTEM PROVIDED HISTORY: Pelvic pain in female  TECHNOLOGIST  PROVIDED HISTORY:  What reading provider will be dictating this exam?->CRC     TECHNIQUE:     Real-time limited transabdominal and transvaginal pelvic ultrasound with  image documentation.  Transvaginal imaging was used for better evaluation of  the endometrium and adnexa.  Real-time duplex ultrasound scan of the arterial  and venous flow of the pelvis with color Doppler flow and spectral waveform  analysis.     COMPARISON:     No relevant prior studies available.     FINDINGS:     Uterus/cervix:  Uterus is retroverted/retroflexed.  Small nabothian cysts  noted within the cervix.  No myometrial mass.  The uterus measures 6.4 x 4.3  x 3.5 cm.  The endometrial stripe measures 0.39 cm in thickness.     Right ovary:  No acute findings.  No torsion.  The right ovary measures 2.1 x  1.5 x 0.8 cm.     Left ovary:  Left ovary was not visualized.     Free fluid:  Trace amount of retrouterine fluid within the cul-de-sac.     Bladder:  Unremarkable as visualized.  Wall is normal thickness for degree of  distention.     IMPRESSION:     1.  Uterus is retroverted/retroflexed.  Otherwise unremarkable in appearance.     2.  Left ovary was not visualized.     3.  Trace amount of retrouterine fluid within the cul-de-sac.     4.  Otherwise nothing acute with no evidence of right ovarian torsion.              Specimen Collected: 07/24/24 14:53 EDT                Discussed normal findings on US.  If pain continues, would F/U with PCP or GI.  All questions answered.       Vitals:  /84   Ht 1.651 m (5' 5\")